# Patient Record
Sex: FEMALE | Race: WHITE | Employment: UNEMPLOYED | URBAN - METROPOLITAN AREA
[De-identification: names, ages, dates, MRNs, and addresses within clinical notes are randomized per-mention and may not be internally consistent; named-entity substitution may affect disease eponyms.]

---

## 2018-05-21 ENCOUNTER — HOSPITAL ENCOUNTER (EMERGENCY)
Age: 58
Discharge: HOME | End: 2018-05-22
Attending: EMERGENCY MEDICINE
Payer: OTHER GOVERNMENT

## 2018-05-21 ENCOUNTER — APPOINTMENT (OUTPATIENT)
Dept: GENERAL RADIOLOGY | Age: 58
End: 2018-05-21
Attending: EMERGENCY MEDICINE
Payer: OTHER GOVERNMENT

## 2018-05-21 DIAGNOSIS — R07.9 ACUTE CHEST PAIN: Primary | ICD-10-CM

## 2018-05-21 DIAGNOSIS — R10.13 ABDOMINAL PAIN, ACUTE, EPIGASTRIC: ICD-10-CM

## 2018-05-21 LAB
ALBUMIN SERPL-MCNC: 4.1 G/DL (ref 3.4–5)
ALBUMIN/GLOB SERPL: 1.1 {RATIO} (ref 0.8–1.7)
ALP SERPL-CCNC: 130 U/L (ref 45–117)
ALT SERPL-CCNC: 24 U/L (ref 13–56)
ANION GAP SERPL CALC-SCNC: 9 MMOL/L (ref 3–18)
AST SERPL-CCNC: 21 U/L (ref 15–37)
BASOPHILS # BLD: 0.1 K/UL (ref 0–0.06)
BASOPHILS NFR BLD: 1 % (ref 0–2)
BILIRUB SERPL-MCNC: 0.4 MG/DL (ref 0.2–1)
BUN SERPL-MCNC: 9 MG/DL (ref 7–18)
BUN/CREAT SERPL: 11 (ref 12–20)
CALCIUM SERPL-MCNC: 9.1 MG/DL (ref 8.5–10.1)
CHLORIDE SERPL-SCNC: 110 MMOL/L (ref 100–108)
CO2 SERPL-SCNC: 24 MMOL/L (ref 21–32)
CREAT SERPL-MCNC: 0.82 MG/DL (ref 0.6–1.3)
DIFFERENTIAL METHOD BLD: ABNORMAL
EOSINOPHIL # BLD: 0.2 K/UL (ref 0–0.4)
EOSINOPHIL NFR BLD: 2 % (ref 0–5)
ERYTHROCYTE [DISTWIDTH] IN BLOOD BY AUTOMATED COUNT: 13.5 % (ref 11.6–14.5)
GLOBULIN SER CALC-MCNC: 3.8 G/DL (ref 2–4)
GLUCOSE SERPL-MCNC: 123 MG/DL (ref 74–99)
HCT VFR BLD AUTO: 38.9 % (ref 35–45)
HGB BLD-MCNC: 13.8 G/DL (ref 12–16)
LIPASE SERPL-CCNC: 447 U/L (ref 73–393)
LYMPHOCYTES # BLD: 2.2 K/UL (ref 0.9–3.6)
LYMPHOCYTES NFR BLD: 32 % (ref 21–52)
MCH RBC QN AUTO: 32.5 PG (ref 24–34)
MCHC RBC AUTO-ENTMCNC: 35.5 G/DL (ref 31–37)
MCV RBC AUTO: 91.5 FL (ref 74–97)
MONOCYTES # BLD: 0.4 K/UL (ref 0.05–1.2)
MONOCYTES NFR BLD: 7 % (ref 3–10)
NEUTS SEG # BLD: 4 K/UL (ref 1.8–8)
NEUTS SEG NFR BLD: 58 % (ref 40–73)
PLATELET # BLD AUTO: 212 K/UL (ref 135–420)
PMV BLD AUTO: 9.8 FL (ref 9.2–11.8)
POTASSIUM SERPL-SCNC: 3.6 MMOL/L (ref 3.5–5.5)
PROT SERPL-MCNC: 7.9 G/DL (ref 6.4–8.2)
RBC # BLD AUTO: 4.25 M/UL (ref 4.2–5.3)
SODIUM SERPL-SCNC: 143 MMOL/L (ref 136–145)
TROPONIN I SERPL-MCNC: <0.02 NG/ML (ref 0–0.04)
WBC # BLD AUTO: 6.8 K/UL (ref 4.6–13.2)

## 2018-05-21 PROCEDURE — 85025 COMPLETE CBC W/AUTO DIFF WBC: CPT | Performed by: EMERGENCY MEDICINE

## 2018-05-21 PROCEDURE — 84484 ASSAY OF TROPONIN QUANT: CPT | Performed by: EMERGENCY MEDICINE

## 2018-05-21 PROCEDURE — 80307 DRUG TEST PRSMV CHEM ANLYZR: CPT | Performed by: EMERGENCY MEDICINE

## 2018-05-21 PROCEDURE — 83690 ASSAY OF LIPASE: CPT | Performed by: EMERGENCY MEDICINE

## 2018-05-21 PROCEDURE — 93005 ELECTROCARDIOGRAM TRACING: CPT

## 2018-05-21 PROCEDURE — 99283 EMERGENCY DEPT VISIT LOW MDM: CPT

## 2018-05-21 PROCEDURE — 96374 THER/PROPH/DIAG INJ IV PUSH: CPT

## 2018-05-21 PROCEDURE — 74011250636 HC RX REV CODE- 250/636: Performed by: EMERGENCY MEDICINE

## 2018-05-21 PROCEDURE — 71045 X-RAY EXAM CHEST 1 VIEW: CPT

## 2018-05-21 PROCEDURE — 99282 EMERGENCY DEPT VISIT SF MDM: CPT

## 2018-05-21 PROCEDURE — 74011000250 HC RX REV CODE- 250: Performed by: EMERGENCY MEDICINE

## 2018-05-21 PROCEDURE — 96375 TX/PRO/DX INJ NEW DRUG ADDON: CPT

## 2018-05-21 PROCEDURE — 80053 COMPREHEN METABOLIC PANEL: CPT | Performed by: EMERGENCY MEDICINE

## 2018-05-21 PROCEDURE — 96376 TX/PRO/DX INJ SAME DRUG ADON: CPT

## 2018-05-21 RX ORDER — MORPHINE SULFATE 10 MG/ML
4 INJECTION, SOLUTION INTRAMUSCULAR; INTRAVENOUS
Status: COMPLETED | OUTPATIENT
Start: 2018-05-21 | End: 2018-05-21

## 2018-05-21 RX ORDER — ONDANSETRON 2 MG/ML
4 INJECTION INTRAMUSCULAR; INTRAVENOUS
Status: COMPLETED | OUTPATIENT
Start: 2018-05-21 | End: 2018-05-21

## 2018-05-21 RX ORDER — FAMOTIDINE 10 MG/ML
20 INJECTION INTRAVENOUS
Status: COMPLETED | OUTPATIENT
Start: 2018-05-21 | End: 2018-05-21

## 2018-05-21 RX ADMIN — FAMOTIDINE 20 MG: 10 INJECTION INTRAVENOUS at 22:16

## 2018-05-21 RX ADMIN — MORPHINE SULFATE 4 MG: 10 INJECTION, SOLUTION INTRAMUSCULAR; INTRAVENOUS at 23:58

## 2018-05-21 RX ADMIN — ONDANSETRON 4 MG: 2 INJECTION INTRAMUSCULAR; INTRAVENOUS at 22:16

## 2018-05-21 RX ADMIN — MORPHINE SULFATE 4 MG: 10 INJECTION INTRAMUSCULAR; INTRAVENOUS; SUBCUTANEOUS at 22:16

## 2018-05-22 ENCOUNTER — APPOINTMENT (OUTPATIENT)
Dept: CT IMAGING | Age: 58
End: 2018-05-22
Attending: EMERGENCY MEDICINE
Payer: OTHER GOVERNMENT

## 2018-05-22 VITALS
HEIGHT: 64 IN | RESPIRATION RATE: 17 BRPM | TEMPERATURE: 97.7 F | DIASTOLIC BLOOD PRESSURE: 72 MMHG | HEART RATE: 95 BPM | BODY MASS INDEX: 22.88 KG/M2 | SYSTOLIC BLOOD PRESSURE: 108 MMHG | WEIGHT: 134 LBS | OXYGEN SATURATION: 100 %

## 2018-05-22 LAB — ETHANOL SERPL-MCNC: 92 MG/DL (ref 0–3)

## 2018-05-22 PROCEDURE — C9113 INJ PANTOPRAZOLE SODIUM, VIA: HCPCS | Performed by: EMERGENCY MEDICINE

## 2018-05-22 PROCEDURE — 74011636320 HC RX REV CODE- 636/320: Performed by: EMERGENCY MEDICINE

## 2018-05-22 PROCEDURE — 74011250636 HC RX REV CODE- 250/636: Performed by: EMERGENCY MEDICINE

## 2018-05-22 PROCEDURE — 74177 CT ABD & PELVIS W/CONTRAST: CPT

## 2018-05-22 RX ORDER — PANTOPRAZOLE SODIUM 40 MG/1
40 TABLET, DELAYED RELEASE ORAL 2 TIMES DAILY
Qty: 60 TAB | Refills: 3 | Status: SHIPPED | OUTPATIENT
Start: 2018-05-22 | End: 2018-06-21

## 2018-05-22 RX ORDER — MAG HYDROX/ALUMINUM HYD/SIMETH 200-200-20
30 SUSPENSION, ORAL (FINAL DOSE FORM) ORAL
Qty: 769 ML | Refills: 0 | Status: SHIPPED | OUTPATIENT
Start: 2018-05-22 | End: 2021-01-01

## 2018-05-22 RX ORDER — PANTOPRAZOLE SODIUM 40 MG/10ML
80 INJECTION, POWDER, LYOPHILIZED, FOR SOLUTION INTRAVENOUS
Status: COMPLETED | OUTPATIENT
Start: 2018-05-22 | End: 2018-05-22

## 2018-05-22 RX ADMIN — IOPAMIDOL 99 ML: 612 INJECTION, SOLUTION INTRAVENOUS at 00:45

## 2018-05-22 RX ADMIN — PANTOPRAZOLE SODIUM 80 MG: 40 INJECTION, POWDER, FOR SOLUTION INTRAVENOUS at 01:46

## 2018-05-22 NOTE — ED PROVIDER NOTES
HPI Comments: Audi Casanova is a 56 y.o. Female with no known cardiac history with c/o onset of central cp, upper abd pain radiating to back that occurred while at rest about 2 hours ago followed by vomiting. No diarrhea, syncope, arm pain, sweats. No blood in stool, melena. No sig abd issues, h/o pancreatitis, biliary disease. Previous ct with no documented sig abd pathology. Nothing taken. Severe stabbing pain worse with palpation. Nothing taken  Admits to alcohol earlier today. The history is provided by the patient and medical records. Past Medical History:   Diagnosis Date    Gastrointestinal disorder     GERD    Herpes        Past Surgical History:   Procedure Laterality Date    HX OTHER SURGICAL           History reviewed. No pertinent family history. Social History     Social History    Marital status:      Spouse name: N/A    Number of children: N/A    Years of education: N/A     Occupational History    Not on file. Social History Main Topics    Smoking status: Current Every Day Smoker     Packs/day: 0.50    Smokeless tobacco: Not on file    Alcohol use Yes    Drug use: Yes     Special: Marijuana      Comment: four days ago     Sexual activity: Not on file     Other Topics Concern    Not on file     Social History Narrative         ALLERGIES: Latex and Valium [diazepam]    Review of Systems   Constitutional: Negative for fever. HENT: Negative for sore throat and trouble swallowing. Eyes: Negative for visual disturbance. Respiratory: Negative for cough. Cardiovascular: Positive for chest pain. Gastrointestinal: Positive for abdominal pain. Negative for blood in stool and constipation. Endocrine: Negative for polyuria. Genitourinary: Negative for difficulty urinating and dysuria. Musculoskeletal: Negative for gait problem. Skin: Negative for rash. Allergic/Immunologic: Negative for immunocompromised state. Neurological: Negative for syncope. Psychiatric/Behavioral: Positive for sleep disturbance. Vitals:    05/21/18 2121   BP: (!) 127/91   Pulse: 95   Resp: 17   Temp: 97.7 °F (36.5 °C)   SpO2: 100%   Weight: 60.8 kg (134 lb)   Height: 5' 4\" (1.626 m)            Physical Exam   Constitutional: She is oriented to person, place, and time. She appears well-developed and well-nourished. She appears distressed. HENT:   Head: Normocephalic and atraumatic. Right Ear: External ear normal.   Left Ear: External ear normal.   Nose: Nose normal.   Mouth/Throat: Uvula is midline, oropharynx is clear and moist and mucous membranes are normal.   Eyes: Conjunctivae are normal. No scleral icterus. Neck: Neck supple. Cardiovascular: Normal rate, regular rhythm, normal heart sounds and intact distal pulses. Pulmonary/Chest: Effort normal and breath sounds normal.   Abdominal: Soft. Normal appearance. She exhibits no distension, no pulsatile midline mass and no mass. There is no hepatosplenomegaly. There is tenderness in the epigastric area. There is guarding. There is no rigidity, no rebound and no CVA tenderness. Musculoskeletal: She exhibits no edema. Neurological: She is alert and oriented to person, place, and time. Gait normal.   Skin: Skin is warm and dry. She is not diaphoretic. Psychiatric: Her behavior is normal.   Nursing note and vitals reviewed.        [facility]      ED Course       Procedures    Vitals:  Patient Vitals for the past 12 hrs:   Temp Pulse Resp BP SpO2   05/21/18 2121 97.7 °F (36.5 °C) 95 17 (!) 127/91 100 %         Medications ordered:   Medications   pantoprazole (PROTONIX) injection 80 mg (not administered)   morphine injection 4 mg (4 mg IntraVENous Given 5/21/18 2216)   ondansetron (ZOFRAN) injection 4 mg (4 mg IntraVENous Given 5/21/18 2216)   famotidine (PF) (PEPCID) injection 20 mg (20 mg IntraVENous Given 5/21/18 2216)   morphine injection 4 mg (4 mg IntraVENous Given 5/21/18 2358)   iopamidol (ISOVUE 300) 61 % contrast injection 100 mL (99 mL IntraVENous Given 5/22/18 0045)         Lab findings:  Recent Results (from the past 12 hour(s))   EKG, 12 LEAD, INITIAL    Collection Time: 05/21/18  9:24 PM   Result Value Ref Range    Ventricular Rate 79 BPM    Atrial Rate 79 BPM    P-R Interval 138 ms    QRS Duration 80 ms    Q-T Interval 394 ms    QTC Calculation (Bezet) 451 ms    Calculated P Axis -10 degrees    Calculated R Axis 57 degrees    Calculated T Axis 51 degrees    Diagnosis       Normal sinus rhythm  Normal ECG  When compared with ECG of 30-DEC-2016 21:28,  No significant change was found     CBC WITH AUTOMATED DIFF    Collection Time: 05/21/18 10:21 PM   Result Value Ref Range    WBC 6.8 4.6 - 13.2 K/uL    RBC 4.25 4.20 - 5.30 M/uL    HGB 13.8 12.0 - 16.0 g/dL    HCT 38.9 35.0 - 45.0 %    MCV 91.5 74.0 - 97.0 FL    MCH 32.5 24.0 - 34.0 PG    MCHC 35.5 31.0 - 37.0 g/dL    RDW 13.5 11.6 - 14.5 %    PLATELET 776 045 - 397 K/uL    MPV 9.8 9.2 - 11.8 FL    NEUTROPHILS 58 40 - 73 %    LYMPHOCYTES 32 21 - 52 %    MONOCYTES 7 3 - 10 %    EOSINOPHILS 2 0 - 5 %    BASOPHILS 1 0 - 2 %    ABS. NEUTROPHILS 4.0 1.8 - 8.0 K/UL    ABS. LYMPHOCYTES 2.2 0.9 - 3.6 K/UL    ABS. MONOCYTES 0.4 0.05 - 1.2 K/UL    ABS. EOSINOPHILS 0.2 0.0 - 0.4 K/UL    ABS. BASOPHILS 0.1 (H) 0.0 - 0.06 K/UL    DF AUTOMATED     METABOLIC PANEL, COMPREHENSIVE    Collection Time: 05/21/18 10:21 PM   Result Value Ref Range    Sodium 143 136 - 145 mmol/L    Potassium 3.6 3.5 - 5.5 mmol/L    Chloride 110 (H) 100 - 108 mmol/L    CO2 24 21 - 32 mmol/L    Anion gap 9 3.0 - 18 mmol/L    Glucose 123 (H) 74 - 99 mg/dL    BUN 9 7.0 - 18 MG/DL    Creatinine 0.82 0.6 - 1.3 MG/DL    BUN/Creatinine ratio 11 (L) 12 - 20      GFR est AA >60 >60 ml/min/1.73m2    GFR est non-AA >60 >60 ml/min/1.73m2    Calcium 9.1 8.5 - 10.1 MG/DL    Bilirubin, total 0.4 0.2 - 1.0 MG/DL    ALT (SGPT) 24 13 - 56 U/L    AST (SGOT) 21 15 - 37 U/L    Alk.  phosphatase 130 (H) 45 - 117 U/L Protein, total 7.9 6.4 - 8.2 g/dL    Albumin 4.1 3.4 - 5.0 g/dL    Globulin 3.8 2.0 - 4.0 g/dL    A-G Ratio 1.1 0.8 - 1.7     LIPASE    Collection Time: 05/21/18 10:21 PM   Result Value Ref Range    Lipase 447 (H) 73 - 393 U/L   TROPONIN I    Collection Time: 05/21/18 10:21 PM   Result Value Ref Range    Troponin-I, Qt. <0.02 0.0 - 0.045 NG/ML   ETHYL ALCOHOL    Collection Time: 05/21/18 10:21 PM   Result Value Ref Range    ALCOHOL(ETHYL),SERUM 92 (H) 0 - 3 MG/DL       EKG interpretation by ED Physician:  nsr with no acute st tw changes  Rate 79, pr 138, qtc 451  No sig change from previous    Cardiac monitor: nl rate reg rhythm. No ectopy  Pulse ox: 100% ra      X-Ray, CT or other radiology findings or impressions:  XR CHEST PORT    (Results Pending)   CT ABD PELV W CONT    (Results Pending)   ct with esophageal thickening. No pancreatitis    Progress notes, Consult notes or additional Procedure notes:   Doubt need for admission. D/w pt need for gi f/u, and will increase her ppi at home  More c/w gi related issue  I have discussed with patient and/or family/sig other the results, interpretation of any imaging if performed, suspected diagnosis and treatment plan to include instructions regarding the diagnoses listed to which understanding was expressed with all questions answered      Reevaluation of patient:   stable    Disposition:  Diagnosis:   1. Acute chest pain    2. Abdominal pain, acute, epigastric        Disposition: home      Follow-up Information     Follow up With Details [address] [facility] IMAN AGUILERA Schedule an appointment as soon as possible for a visit  [place] [id number] [facility] Dodie Marie [address]    Delores Rodriguez MD Schedule an appointment as soon as possible for a visit to get EGD set up [address]  [address]  [phone]              Patient's Medications   Start Taking    ALUM-MAG HYDROXIDE-SIMETH (MYLANTA) 200-200-20 MG/5 ML SUSP    Take 30 mL by mouth four (4) times daily as needed. PANTOPRAZOLE (PROTONIX) 40 MG TABLET    Take 1 Tab by mouth two (2) times a day for 30 days. Continue Taking    ALBUTEROL (PROVENTIL HFA, VENTOLIN HFA, PROAIR HFA) 90 MCG/ACTUATION INHALER    Take 1 Puff by inhalation every four (4) hours as needed for Wheezing. B.INFANTIS-B. ANI-B. LONG-B.BIFI (PROBIOTIC 4X) 10-15 MG TBEC    Take  by mouth. DEXTROMETHORPHAN-GUAIFENESIN (ROBITUSSIN-DM)  MG/5 ML SYRUP    Take 10 mL by mouth every six (6) hours as needed for Cough. ONDANSETRON HCL (ZOFRAN, AS HYDROCHLORIDE,) 4 MG TABLET    Take 2 Tabs by mouth every eight (8) hours as needed for Nausea. These Medications have changed    No medications on file   Stop Taking    ESOMEPRAZOLE MAGNESIUM (NEXIUM PO)    Take  by mouth.

## 2018-05-22 NOTE — ED TRIAGE NOTES
C/o burning epigastric pain that radiates down abdomen and midback with nausea and vomiting x1 hour.

## 2018-05-22 NOTE — ED NOTES
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 She has  and will talk to her PCP about who to see.

## 2018-05-22 NOTE — ED NOTES
1: 57 AM  05/22/18     Discharge instructions given to patient (name) with verbalization of understanding. Patient accompanied by self. Patient discharged with the following prescriptions Mylanta, Protonix. Patient discharged to home (destination).       Poncho Wilkinson RN

## 2018-05-23 LAB
ATRIAL RATE: 79 BPM
CALCULATED P AXIS, ECG09: -10 DEGREES
CALCULATED R AXIS, ECG10: 57 DEGREES
CALCULATED T AXIS, ECG11: 51 DEGREES
DIAGNOSIS, 93000: NORMAL
P-R INTERVAL, ECG05: 138 MS
Q-T INTERVAL, ECG07: 394 MS
QRS DURATION, ECG06: 80 MS
QTC CALCULATION (BEZET), ECG08: 451 MS
VENTRICULAR RATE, ECG03: 79 BPM

## 2020-12-31 ENCOUNTER — HOSPITAL ENCOUNTER (INPATIENT)
Age: 60
LOS: 1 days | Discharge: HOME | DRG: 917 | End: 2021-01-01
Attending: EMERGENCY MEDICINE | Admitting: INTERNAL MEDICINE
Payer: OTHER GOVERNMENT

## 2020-12-31 ENCOUNTER — APPOINTMENT (OUTPATIENT)
Dept: CT IMAGING | Age: 60
DRG: 917 | End: 2020-12-31
Attending: EMERGENCY MEDICINE
Payer: OTHER GOVERNMENT

## 2020-12-31 DIAGNOSIS — F19.921 DRUG INTOXICATION WITH DELIRIUM (HCC): Primary | ICD-10-CM

## 2020-12-31 PROBLEM — F19.10 SUBSTANCE ABUSE (HCC): Status: ACTIVE | Noted: 2020-12-31

## 2020-12-31 PROBLEM — Z72.0 TOBACCO ABUSE: Status: ACTIVE | Noted: 2020-12-31

## 2020-12-31 PROBLEM — R06.2 WHEEZES: Status: ACTIVE | Noted: 2020-12-31

## 2020-12-31 PROBLEM — Z88.8: Status: ACTIVE | Noted: 2020-12-31

## 2020-12-31 PROBLEM — T50.901A DRUG OVERDOSE: Status: ACTIVE | Noted: 2020-12-31

## 2020-12-31 PROBLEM — K21.9 GERD (GASTROESOPHAGEAL REFLUX DISEASE): Status: ACTIVE | Noted: 2020-12-31

## 2020-12-31 PROBLEM — F10.929 ALCOHOL INTOXICATION (HCC): Status: ACTIVE | Noted: 2020-12-31

## 2020-12-31 LAB
ALBUMIN SERPL-MCNC: 3 G/DL (ref 3.4–5)
ALBUMIN SERPL-MCNC: 3.4 G/DL (ref 3.4–5)
ALBUMIN/GLOB SERPL: 0.9 {RATIO} (ref 0.8–1.7)
ALBUMIN/GLOB SERPL: 1 {RATIO} (ref 0.8–1.7)
ALP SERPL-CCNC: 130 U/L (ref 45–117)
ALP SERPL-CCNC: 141 U/L (ref 45–117)
ALT SERPL-CCNC: 58 U/L (ref 13–56)
ALT SERPL-CCNC: 67 U/L (ref 13–56)
AMPHET UR QL SCN: NEGATIVE
ANION GAP SERPL CALC-SCNC: 5 MMOL/L (ref 3–18)
APAP SERPL-MCNC: <2 UG/ML (ref 10–30)
AST SERPL-CCNC: 62 U/L (ref 10–38)
AST SERPL-CCNC: 81 U/L (ref 10–38)
BARBITURATES UR QL SCN: NEGATIVE
BASOPHILS # BLD: 0 K/UL (ref 0–0.1)
BASOPHILS NFR BLD: 1 % (ref 0–2)
BENZODIAZ UR QL: NEGATIVE
BILIRUB DIRECT SERPL-MCNC: 0.1 MG/DL (ref 0–0.2)
BILIRUB SERPL-MCNC: 0.3 MG/DL (ref 0.2–1)
BILIRUB SERPL-MCNC: 0.3 MG/DL (ref 0.2–1)
BUN SERPL-MCNC: 7 MG/DL (ref 7–18)
BUN/CREAT SERPL: 12 (ref 12–20)
CALCIUM SERPL-MCNC: 8 MG/DL (ref 8.5–10.1)
CANNABINOIDS UR QL SCN: NEGATIVE
CHLORIDE SERPL-SCNC: 113 MMOL/L (ref 100–111)
CK SERPL-CCNC: 46 U/L (ref 26–192)
CO2 SERPL-SCNC: 25 MMOL/L (ref 21–32)
COCAINE UR QL SCN: NEGATIVE
COVID-19 RAPID TEST, COVR: NOT DETECTED
CREAT SERPL-MCNC: 0.59 MG/DL (ref 0.6–1.3)
DIFFERENTIAL METHOD BLD: ABNORMAL
EOSINOPHIL # BLD: 0.1 K/UL (ref 0–0.4)
EOSINOPHIL NFR BLD: 2 % (ref 0–5)
ERYTHROCYTE [DISTWIDTH] IN BLOOD BY AUTOMATED COUNT: 12.9 % (ref 11.6–14.5)
ETHANOL SERPL-MCNC: 209 MG/DL (ref 0–3)
GLOBULIN SER CALC-MCNC: 3.2 G/DL (ref 2–4)
GLOBULIN SER CALC-MCNC: 3.4 G/DL (ref 2–4)
GLUCOSE SERPL-MCNC: 79 MG/DL (ref 74–99)
HCT VFR BLD AUTO: 41.5 % (ref 35–45)
HDSCOM,HDSCOM: NORMAL
HGB BLD-MCNC: 13.7 G/DL (ref 12–16)
LACTATE SERPL-SCNC: 2.9 MMOL/L (ref 0.4–2)
LYMPHOCYTES # BLD: 2.2 K/UL (ref 0.9–3.6)
LYMPHOCYTES NFR BLD: 40 % (ref 21–52)
MCH RBC QN AUTO: 34 PG (ref 24–34)
MCHC RBC AUTO-ENTMCNC: 33 G/DL (ref 31–37)
MCV RBC AUTO: 103 FL (ref 74–97)
METHADONE UR QL: NEGATIVE
MONOCYTES # BLD: 0.3 K/UL (ref 0.05–1.2)
MONOCYTES NFR BLD: 5 % (ref 3–10)
NEUTS SEG # BLD: 2.8 K/UL (ref 1.8–8)
NEUTS SEG NFR BLD: 52 % (ref 40–73)
OPIATES UR QL: NEGATIVE
PCP UR QL: NEGATIVE
PLATELET # BLD AUTO: 171 K/UL (ref 135–420)
PMV BLD AUTO: 9.8 FL (ref 9.2–11.8)
POTASSIUM SERPL-SCNC: 3.6 MMOL/L (ref 3.5–5.5)
PROT SERPL-MCNC: 6.2 G/DL (ref 6.4–8.2)
PROT SERPL-MCNC: 6.8 G/DL (ref 6.4–8.2)
RBC # BLD AUTO: 4.03 M/UL (ref 4.2–5.3)
SALICYLATES SERPL-MCNC: 2.7 MG/DL (ref 2.8–20)
SODIUM SERPL-SCNC: 143 MMOL/L (ref 136–145)
SOURCE, COVRS: NORMAL
SPECIMEN TYPE, XMCV1T: NORMAL
WBC # BLD AUTO: 5.4 K/UL (ref 4.6–13.2)

## 2020-12-31 PROCEDURE — 87635 SARS-COV-2 COVID-19 AMP PRB: CPT

## 2020-12-31 PROCEDURE — 74011000250 HC RX REV CODE- 250: Performed by: INTERNAL MEDICINE

## 2020-12-31 PROCEDURE — 65610000006 HC RM INTENSIVE CARE

## 2020-12-31 PROCEDURE — 74011250636 HC RX REV CODE- 250/636: Performed by: INTERNAL MEDICINE

## 2020-12-31 PROCEDURE — 85025 COMPLETE CBC W/AUTO DIFF WBC: CPT

## 2020-12-31 PROCEDURE — 93005 ELECTROCARDIOGRAM TRACING: CPT

## 2020-12-31 PROCEDURE — 80307 DRUG TEST PRSMV CHEM ANLYZR: CPT

## 2020-12-31 PROCEDURE — 94640 AIRWAY INHALATION TREATMENT: CPT

## 2020-12-31 PROCEDURE — 99285 EMERGENCY DEPT VISIT HI MDM: CPT

## 2020-12-31 PROCEDURE — 2709999900 HC NON-CHARGEABLE SUPPLY

## 2020-12-31 PROCEDURE — 80076 HEPATIC FUNCTION PANEL: CPT

## 2020-12-31 PROCEDURE — 82550 ASSAY OF CK (CPK): CPT

## 2020-12-31 PROCEDURE — 74011250636 HC RX REV CODE- 250/636: Performed by: HOSPITALIST

## 2020-12-31 PROCEDURE — 96374 THER/PROPH/DIAG INJ IV PUSH: CPT

## 2020-12-31 PROCEDURE — 74011250636 HC RX REV CODE- 250/636: Performed by: EMERGENCY MEDICINE

## 2020-12-31 PROCEDURE — 70450 CT HEAD/BRAIN W/O DYE: CPT

## 2020-12-31 PROCEDURE — 74011000258 HC RX REV CODE- 258: Performed by: INTERNAL MEDICINE

## 2020-12-31 PROCEDURE — 80053 COMPREHEN METABOLIC PANEL: CPT

## 2020-12-31 PROCEDURE — 83605 ASSAY OF LACTIC ACID: CPT

## 2020-12-31 RX ORDER — IPRATROPIUM BROMIDE AND ALBUTEROL SULFATE 2.5; .5 MG/3ML; MG/3ML
3 SOLUTION RESPIRATORY (INHALATION)
Status: DISCONTINUED | OUTPATIENT
Start: 2020-12-31 | End: 2021-01-01 | Stop reason: HOSPADM

## 2020-12-31 RX ORDER — ZOLPIDEM TARTRATE 10 MG/1
10 TABLET ORAL
COMMUNITY
End: 2021-01-01

## 2020-12-31 RX ORDER — NALOXONE HYDROCHLORIDE 0.4 MG/ML
0.4 INJECTION, SOLUTION INTRAMUSCULAR; INTRAVENOUS; SUBCUTANEOUS AS NEEDED
Status: DISCONTINUED | OUTPATIENT
Start: 2020-12-31 | End: 2021-01-01 | Stop reason: HOSPADM

## 2020-12-31 RX ORDER — NALOXONE HYDROCHLORIDE 1 MG/ML
0.4 INJECTION INTRAMUSCULAR; INTRAVENOUS; SUBCUTANEOUS
Status: COMPLETED | OUTPATIENT
Start: 2020-12-31 | End: 2020-12-31

## 2020-12-31 RX ORDER — ENOXAPARIN SODIUM 100 MG/ML
40 INJECTION SUBCUTANEOUS DAILY
Status: DISCONTINUED | OUTPATIENT
Start: 2021-01-01 | End: 2021-01-01 | Stop reason: HOSPADM

## 2020-12-31 RX ORDER — SODIUM CHLORIDE 0.9 % (FLUSH) 0.9 %
5-40 SYRINGE (ML) INJECTION EVERY 8 HOURS
Status: DISCONTINUED | OUTPATIENT
Start: 2020-12-31 | End: 2021-01-01 | Stop reason: HOSPADM

## 2020-12-31 RX ORDER — ONDANSETRON 2 MG/ML
4 INJECTION INTRAMUSCULAR; INTRAVENOUS
Status: DISCONTINUED | OUTPATIENT
Start: 2020-12-31 | End: 2021-01-01 | Stop reason: HOSPADM

## 2020-12-31 RX ORDER — POLYETHYLENE GLYCOL 3350 17 G/17G
17 POWDER, FOR SOLUTION ORAL DAILY PRN
Status: DISCONTINUED | OUTPATIENT
Start: 2020-12-31 | End: 2021-01-01 | Stop reason: HOSPADM

## 2020-12-31 RX ORDER — SODIUM CHLORIDE 0.9 % (FLUSH) 0.9 %
5-40 SYRINGE (ML) INJECTION AS NEEDED
Status: DISCONTINUED | OUTPATIENT
Start: 2020-12-31 | End: 2021-01-01 | Stop reason: HOSPADM

## 2020-12-31 RX ORDER — FENTANYL CITRATE 50 UG/ML
12.5 INJECTION, SOLUTION INTRAMUSCULAR; INTRAVENOUS
Status: DISCONTINUED | OUTPATIENT
Start: 2020-12-31 | End: 2021-01-01 | Stop reason: HOSPADM

## 2020-12-31 RX ORDER — GABAPENTIN 100 MG/1
100 CAPSULE ORAL 3 TIMES DAILY
COMMUNITY

## 2020-12-31 RX ORDER — ACETAMINOPHEN 650 MG/1
650 SUPPOSITORY RECTAL
Status: DISCONTINUED | OUTPATIENT
Start: 2020-12-31 | End: 2021-01-01 | Stop reason: HOSPADM

## 2020-12-31 RX ORDER — PROMETHAZINE HYDROCHLORIDE 25 MG/1
12.5 TABLET ORAL
Status: DISCONTINUED | OUTPATIENT
Start: 2020-12-31 | End: 2021-01-01 | Stop reason: HOSPADM

## 2020-12-31 RX ORDER — IPRATROPIUM BROMIDE AND ALBUTEROL SULFATE 2.5; .5 MG/3ML; MG/3ML
3 SOLUTION RESPIRATORY (INHALATION)
Status: COMPLETED | OUTPATIENT
Start: 2020-12-31 | End: 2020-12-31

## 2020-12-31 RX ORDER — FENTANYL CITRATE 50 UG/ML
12.5 INJECTION, SOLUTION INTRAMUSCULAR; INTRAVENOUS ONCE
Status: COMPLETED | OUTPATIENT
Start: 2020-12-31 | End: 2020-12-31

## 2020-12-31 RX ORDER — ACETAMINOPHEN 325 MG/1
650 TABLET ORAL
Status: DISCONTINUED | OUTPATIENT
Start: 2020-12-31 | End: 2021-01-01 | Stop reason: HOSPADM

## 2020-12-31 RX ADMIN — NALOXONE HYDROCHLORIDE 0.4 MG: 1 INJECTION PARENTERAL at 12:36

## 2020-12-31 RX ADMIN — FAMOTIDINE 20 MG: 10 INJECTION INTRAVENOUS at 21:34

## 2020-12-31 RX ADMIN — Medication 10 ML: at 22:00

## 2020-12-31 RX ADMIN — IPRATROPIUM BROMIDE AND ALBUTEROL SULFATE 3 ML: .5; 3 SOLUTION RESPIRATORY (INHALATION) at 15:31

## 2020-12-31 RX ADMIN — FENTANYL CITRATE 12.5 MCG: 50 INJECTION, SOLUTION INTRAMUSCULAR; INTRAVENOUS at 23:20

## 2020-12-31 RX ADMIN — NALOXONE HYDROCHLORIDE 0.4 MG: 1 INJECTION PARENTERAL at 12:42

## 2020-12-31 RX ADMIN — THIAMINE HYDROCHLORIDE: 100 INJECTION, SOLUTION INTRAMUSCULAR; INTRAVENOUS at 15:33

## 2020-12-31 RX ADMIN — IPRATROPIUM BROMIDE AND ALBUTEROL SULFATE 3 ML: .5; 3 SOLUTION RESPIRATORY (INHALATION) at 20:49

## 2020-12-31 RX ADMIN — NALOXONE HYDROCHLORIDE 0.4 MG: 1 INJECTION PARENTERAL at 12:31

## 2020-12-31 RX ADMIN — SODIUM CHLORIDE 1000 ML: 9 INJECTION, SOLUTION INTRAVENOUS at 15:03

## 2020-12-31 RX ADMIN — Medication 10 ML: at 18:27

## 2020-12-31 RX ADMIN — FENTANYL CITRATE 12.5 MCG: 50 INJECTION, SOLUTION INTRAMUSCULAR; INTRAVENOUS at 15:29

## 2020-12-31 NOTE — PROGRESS NOTES
([id number]) TRANSFER - IN REPORT:    Verbal report received from Vamsi Palafox, [facility] (name) on [facility]  being received from ED (unit) for routine progression of care      Report consisted of patients Situation, Background, Assessment and   Recommendations(SBAR). Information from the following report(s) SBAR, Intake/Output, MAR, Recent Results, Cardiac Rhythm NSR and Alarm Parameters  was reviewed with the receiving nurse. Opportunity for questions and clarification was provided. Assessment completed upon patients arrival to unit and care assumed. ([id number])  called. Updated on care and questions answered at this time.

## 2020-12-31 NOTE — ED PROVIDER NOTES
EMERGENCY DEPARTMENT HISTORY AND PHYSICAL EXAM    12:08 PM      Date: 12/31/2020  Patient Name: Monica Maddox    History of Presenting Illness     Chief Complaint   Patient presents with    Drug Overdose         History Provided By: EMS    Additional History (Context): Monica Maddox is a 64 y.o. female presents with EMS was called for unresponsive subject, pinpoint pupils minimally responsive given 2 mg of Narcan IN, became more alert. Also empty bottle of Ambien found in the area. On arrival to the ER she has eyes open but is otherwise not responding. History and review of systems limited by patient's unresponsive state. PCP: None    Chief Complaint:   Duration:    Timing:    Location:   Quality:   Severity:   Modifying Factors:   Associated Symptoms:       Current Outpatient Medications   Medication Sig Dispense Refill    alum-mag hydroxide-simeth (MYLANTA) 200-200-20 mg/5 mL susp Take 30 mL by mouth four (4) times daily as needed. 769 mL 0    ondansetron hcl (ZOFRAN, AS HYDROCHLORIDE,) 4 mg tablet Take 2 Tabs by mouth every eight (8) hours as needed for Nausea. 12 Tab 0    albuterol (PROVENTIL HFA, VENTOLIN HFA, PROAIR HFA) 90 mcg/actuation inhaler Take 1 Puff by inhalation every four (4) hours as needed for Wheezing. 1 Inhaler 0    dextromethorphan-guaiFENesin (ROBITUSSIN-DM)  mg/5 mL syrup Take 10 mL by mouth every six (6) hours as needed for Cough. 240 mL 0    B.infantis-B.ani-B.long-B.bifi (PROBIOTIC 4X) 10-15 mg TbEC Take  by mouth. Past History     Past Medical History:  Past Medical History:   Diagnosis Date    Gastrointestinal disorder     GERD    Herpes        Past Surgical History:  Past Surgical History:   Procedure Laterality Date    HX OTHER SURGICAL         Family History:  History reviewed. No pertinent family history.     Social History:  Social History     Tobacco Use    Smoking status: Current Every Day Smoker     Packs/day: 0.50   Substance Use Topics    Alcohol use: Yes    Drug use: Yes     Types: Marijuana     Comment: four days ago        Allergies: Allergies   Allergen Reactions    Latex Hives    Valium [Diazepam] Swelling         Review of Systems     Review of Systems      Physical Exam       Patient Vitals for the past 12 hrs:   Temp Pulse Resp BP SpO2   12/31/20 1354 97.3 °F (36.3 °C)       12/31/20 1345  81  94/62 97 %   12/31/20 1325  80  93/70 99 %   12/31/20 1300  84  93/64 96 %   12/31/20 1230  81   98 %   12/31/20 1215  87  110/74 97 %   12/31/20 1130    105/71 96 %   12/31/20 1121  90 16 120/78 97 %       Physical Exam  Vitals signs and nursing note reviewed. Constitutional:       Appearance: She is well-developed. Comments: Obtunded, unresponsive to pain. HENT:      Head: Normocephalic and atraumatic. Eyes:      General: No scleral icterus. Conjunctiva/sclera: Conjunctivae normal.      Comments: Disconjugate gaze midrange pupils appropriately reactive to light. Neck:      Musculoskeletal: Normal range of motion and neck supple. Vascular: No JVD. Cardiovascular:      Rate and Rhythm: Normal rate and regular rhythm. Heart sounds: Normal heart sounds. Comments: 4 intact extremity pulses  Pulmonary:      Effort: Pulmonary effort is normal.      Breath sounds: Normal breath sounds. Comments: Spontaneous unlabored respirations, protecting her airway  Abdominal:      Palpations: Abdomen is soft. There is no mass. Tenderness: There is no abdominal tenderness. Musculoskeletal: Normal range of motion. Lymphadenopathy:      Cervical: No cervical adenopathy. Skin:     General: Skin is warm and dry.            Diagnostic Study Results   Labs -  Recent Results (from the past 12 hour(s))   CBC WITH AUTOMATED DIFF    Collection Time: 12/31/20 11:30 AM   Result Value Ref Range    WBC 5.4 4.6 - 13.2 K/uL    RBC 4.03 (L) 4.20 - 5.30 M/uL    HGB 13.7 12.0 - 16.0 g/dL    HCT 41.5 35.0 - 45.0 %    .0 (H) 74.0 - 97.0 FL    MCH 34.0 24.0 - 34.0 PG    MCHC 33.0 31.0 - 37.0 g/dL    RDW 12.9 11.6 - 14.5 %    PLATELET 220 998 - 660 K/uL    MPV 9.8 9.2 - 11.8 FL    NEUTROPHILS 52 40 - 73 %    LYMPHOCYTES 40 21 - 52 %    MONOCYTES 5 3 - 10 %    EOSINOPHILS 2 0 - 5 %    BASOPHILS 1 0 - 2 %    ABS. NEUTROPHILS 2.8 1.8 - 8.0 K/UL    ABS. LYMPHOCYTES 2.2 0.9 - 3.6 K/UL    ABS. MONOCYTES 0.3 0.05 - 1.2 K/UL    ABS. EOSINOPHILS 0.1 0.0 - 0.4 K/UL    ABS. BASOPHILS 0.0 0.0 - 0.1 K/UL    DF AUTOMATED     METABOLIC PANEL, COMPREHENSIVE    Collection Time: 12/31/20 11:30 AM   Result Value Ref Range    Sodium 143 136 - 145 mmol/L    Potassium 3.6 3.5 - 5.5 mmol/L    Chloride 113 (H) 100 - 111 mmol/L    CO2 25 21 - 32 mmol/L    Anion gap 5 3.0 - 18 mmol/L    Glucose 79 74 - 99 mg/dL    BUN 7 7.0 - 18 MG/DL    Creatinine 0.59 (L) 0.6 - 1.3 MG/DL    BUN/Creatinine ratio 12 12 - 20      GFR est AA >60 >60 ml/min/1.73m2    GFR est non-AA >60 >60 ml/min/1.73m2    Calcium 8.0 (L) 8.5 - 10.1 MG/DL    Bilirubin, total 0.3 0.2 - 1.0 MG/DL    ALT (SGPT) 67 (H) 13 - 56 U/L    AST (SGOT) 81 (H) 10 - 38 U/L    Alk.  phosphatase 141 (H) 45 - 117 U/L    Protein, total 6.8 6.4 - 8.2 g/dL    Albumin 3.4 3.4 - 5.0 g/dL    Globulin 3.4 2.0 - 4.0 g/dL    A-G Ratio 1.0 0.8 - 1.7     ETHYL ALCOHOL    Collection Time: 12/31/20 11:30 AM   Result Value Ref Range    ALCOHOL(ETHYL),SERUM 209 (H) 0 - 3 MG/DL   SALICYLATE    Collection Time: 12/31/20 11:30 AM   Result Value Ref Range    Salicylate level 2.7 (L) 2.8 - 20.0 MG/DL   ACETAMINOPHEN    Collection Time: 12/31/20 11:30 AM   Result Value Ref Range    Acetaminophen level <2 (L) 10.0 - 30.0 ug/mL   DRUG SCREEN, URINE    Collection Time: 12/31/20 12:20 PM   Result Value Ref Range    BENZODIAZEPINES Negative NEG      BARBITURATES Negative NEG      THC (TH-CANNABINOL) Negative NEG      OPIATES Negative NEG      PCP(PHENCYCLIDINE) Negative NEG      COCAINE Negative NEG      AMPHETAMINES Negative NEG METHADONE Negative NEG      HDSCOM (NOTE)    SARS-COV-2    Collection Time: 12/31/20 12:20 PM   Result Value Ref Range    Specimen source Nasopharyngeal      COVID-19 rapid test Not detected NOTD      Specimen type NP Swab     EKG, 12 LEAD, INITIAL    Collection Time: 12/31/20 12:27 PM   Result Value Ref Range    Ventricular Rate 82 BPM    Atrial Rate 82 BPM    P-R Interval 138 ms    QRS Duration 80 ms    Q-T Interval 390 ms    QTC Calculation (Bezet) 455 ms    Calculated P Axis 42 degrees    Calculated R Axis 51 degrees    Calculated T Axis 50 degrees    Diagnosis       Normal sinus rhythm  Low voltage QRS  Septal infarct , age undetermined  Abnormal ECG  When compared with ECG of 21-MAY-2018 21:24,  Septal infarct is now present         Radiologic Studies -   CT HEAD WO CONT   Final Result   IMPRESSION:      No acute intracranial abnormality. _______________            Ct Head Wo Cont    Result Date: 12/31/2020  _______________ EXAM: CT HEAD WO CONT. _______________ CLINICAL INDICATION/HISTORY: Unresponsive suspect drug overdose. -Additional: None. COMPARISON: CT of 02/15/2015. _______________ TECHNIQUE/COMPARISON: Nonenhanced CT examination of the head was performed. Sagittal and coronal series were reformatted from the axial source images. One or more dose reduction techniques were used on this CT: automated exposure control, adjustment of the mAs and/or kVp according to patient size, and iterative reconstruction techniques. The specific techniques used on this CT exam have been documented in the patient's electronic medical record. Digital Imaging and Communications in Medicine (DICOM) format image data are available to nonaffiliated external healthcare facilities or entities on a secure, media free, reciprocally searchable basis with patient authorization for at least a 12-month period after this study.  _______________ FINDINGS: BRAIN AND POSTERIOR FOSSA: There is no evidence of acute vascular territorial ischemia, intracranial hemorrhage, midline shift or mass effect. The ventricles and sulci are within normal limits for the patient's age. EXTRA-AXIAL SPACES: There are no abnormal extra-axial fluid collections. CALVARIA AND SOFT TISSUES: No acute calvarial or extracalvarial soft tissue findings of concern. OTHER: The visualized paranasal sinuses are essentially clear, as are the mastoid air cells bilaterally. _______________     IMPRESSION: No acute intracranial abnormality. _______________       Medications ordered:   Medications   naloxone (NARCAN) injection 0.4 mg (0.4 mg IntraVENous Given 12/31/20 1242)         Medical Decision Making   Initial Medical Decision Making and DDx:     Highly suspicious for overdose, recreational or intentional with suicidal intent.  Blood sugar was okay for EMS do not suspect hypoglycemia.  Will evaluate for other metabolic abnormalities, alcohol intoxication salicylate acetaminophen other drugs of abuse.  We will give her additional Narcan.    ED Course: Progress Notes, Reevaluation, and Consults:  ED Course as of Dec 31 1419   Thu Dec 31, 2020   1246 Twelve-lead EKG sinus rhythm at 82 no acute disturbance of the electrical intervals.    [CB]   1355 All diagnostic studies are back drug screen is negative, alcohol level 200 CT scan clear.  Nurse will call poison control.  Calling hospitalist for admission, assuming this is effects of Ambien she may be unresponsive for a day or 2    [CB]   1356 Patient is still completely unresponsive, no hypoxia on room air protecting her airway well.    [CB]      ED Course User Index  [CB] Arnulfo Plaza MD     2:19 PM there is Álvaro discussed with poison control.  I discussed with Dr. Davidson hospitalist, discussed probable benzodiazepine analog overdose with alcohol involved.  Patient protecting her airway but unresponsive and expected long half-life of the drug.  He agrees with the admission.    I am the first provider for this  patient. I reviewed the vital signs, available nursing notes, past medical history, past surgical history, family history and social history. Patient Vitals for the past 12 hrs:   Temp Pulse Resp BP SpO2   12/31/20 1354 97.3 °F (36.3 °C)       12/31/20 1345  81  94/62 97 %   12/31/20 1325  80  93/70 99 %   12/31/20 1300  84  93/64 96 %   12/31/20 1230  81   98 %   12/31/20 1215  87  110/74 97 %   12/31/20 1130    105/71 96 %   12/31/20 1121  90 16 120/78 97 %       Vital Signs-Reviewed the patient's vital signs. Pulse Oximetry Analysis, Cardiac Monitor, 12 lead ekg: No hypoxia on room air  Interpreted by the EP. Records Reviewed: Nursing notes reviewed (Time of Review: 12:08 PM)    Procedures:   Critical Care Time:   Aspirin: (was aspirin given for stroke?)    Diagnosis     Clinical Impression:   1. Drug intoxication with delirium ([facility]        Disposition: Admitted      Follow-up Information    None          Patient's Medications   Start Taking    No medications on file   Continue Taking    ALBUTEROL (PROVENTIL HFA, VENTOLIN HFA, PROAIR HFA) 90 MCG/ACTUATION INHALER    Take 1 Puff by inhalation every four (4) hours as needed for Wheezing. ALUM-MAG HYDROXIDE-SIMETH (MYLANTA) 200-200-20 MG/5 ML SUSP    Take 30 mL by mouth four (4) times daily as needed. B.INFANTIS-B. ANI-B. LONG-B.BIFI (PROBIOTIC 4X) 10-15 MG TBEC    Take  by mouth. DEXTROMETHORPHAN-GUAIFENESIN (ROBITUSSIN-DM)  MG/5 ML SYRUP    Take 10 mL by mouth every six (6) hours as needed for Cough. ONDANSETRON HCL (ZOFRAN, AS HYDROCHLORIDE,) 4 MG TABLET    Take 2 Tabs by mouth every eight (8) hours as needed for Nausea.    These Medications have changed    No medications on file   Stop Taking    No medications on file     _______________________________    Notes:    Samira Llanes MD using Dragon dictation      _______________________________

## 2020-12-31 NOTE — PROGRESS NOTES
Initial assessment attempted with pt's spouse, Harley Henderson ([phone] and [phone] via phone. Left a non urgent voicemail requesting a return call.            Johnson Jo, MSN, RN, ACM-RN   ED Outcomes Manager  [phone] (phone)

## 2020-12-31 NOTE — ED NOTES
TRANSFER - ED to INPATIENT REPORT:    Verbal report given to Damari GALLEGOS(name) on Amado Santamaria  being transferred to [facility](unit) for routine progression of care       Report consisted of patients Situation, Background, Assessment and   Recommendations(SBAR). SBAR report made available to receiving floor on this patient being transferred to [address] [place] [address] [phone]  for routine progression of care       Admitting diagnosis Drug overdose [T50.901A]    Information from the following report(s) SBAR, ED Summary and MAR was made available to receiving floor.     Lines:   Peripheral IV 12/31/20 Left Wrist (Active)   Site Assessment Clean, dry, & intact 12/31/20 1126   Phlebitis Assessment 0 12/31/20 1126   Infiltration Assessment 0 12/31/20 1126   Dressing Status Clean, dry, & intact 12/31/20 1126   Dressing Type Transparent 12/31/20 1126   Hub Color/Line Status Pink;Flushed;Patent 12/31/20 1126        Patient eyes open to voice and appears alert to person     Valuables transported with patient     MAP (Monitor): 87 =Monitored (most recent)  Vitals w/ MEWS Score (last day)     Date/Time MEWS Score Pulse Resp Temp BP Level of Consciousness SpO2    12/31/20 15:36:12  2  94  18  97.3 °F (36.3 °C)  120/75  Responds to Voice  100 %    12/31/20 1532    96          100 %    12/31/20 1515    96      120/75    97 %    12/31/20 1430    83      103/75    97 %    12/31/20 1415    85          97 %    12/31/20 1400    84          97 %    12/31/20 1354        97.3 °F (36.3 °C)          12/31/20 1345    81      94/62    97 %    12/31/20 1330    82          97 %    12/31/20 1325    80      93/70    99 %    12/31/20 1315    87          95 %    12/31/20 1300    84      93/64    96 %    12/31/20 1230    81          98 %    12/31/20 1215    87      110/74    97 %    12/31/20 1130          105/71    96 %    12/31/20 1121    90  16    120/78  (!) Responds to Pain  97 %

## 2020-12-31 NOTE — ED TRIAGE NOTES
Pt arrives to ed via ems for c/o suspected drug overdose. Pt unresponsive for EMS, pinpoint pupils. Narcan 2mg administered by ems, pt became more alert. Pt eyes open to pain on arrival but otherwise not answering questions or following directions. Pt 99% on room air. Empty ambien bottle found with pt.   Pt  per EMS

## 2020-12-31 NOTE — PROGRESS NOTES
Physical Exam  Skin:     General: Skin is warm and dry. Capillary Refill: Capillary refill takes less than 2 seconds. Primary Nurse Aditi Kent RN and Lulu Alexander RN performed a dual skin assessment on this patient No impairment noted Lavell score is 18.

## 2020-12-31 NOTE — ED NOTES
Pt did not respond to any of the narcan administered. Provider made aware.   No further orders received

## 2020-12-31 NOTE — ED NOTES
Poison control called by RN, provider made aware of poison control recommendations. No orders received.

## 2020-12-31 NOTE — H&P
Internal Medicine History and Physical  Note           NAME:  Gardenia Rollins   :   1960   MRN:  [id number]     PCP:  None     Date/Time:  2020 3:07 PM      I hereby certify this patient for admission based upon medical necessity as noted below:        Assessment / plan :        Principal Problem:    Drug overdose (2020)    Active Problems:    Alcohol intoxication ([address] (2020)      Substance abuse ([facility] [address] (2020)      GERD (gastroesophageal reflux disease) (2020)      Tobacco abuse (2020)      Wheezes (2020)      Allergy to benzodiazepine (2020)       Admit to ICU- IVF banana back   CK level. Sedation if needed. PT/INR. Ammonia level/  Follow poison control center recs  Duoneb Nebs for wheezes, possible COPD   Further history from patient and exam when waking up   PPI  Fentanyl - one small dose iv might help her spasim state . Dw pharmacy    # Continue home regimen / Medications when appropriate. -DVT prophylaxis :  lovenox. - Code Status : FULL    -Other chronic medical problems as per past history. Further management depend on the course of the case and expanded data base. DISPO - pt to be admitted at this time for reasons addressed above, continued hospitalization for ongoing assessment and treatment indicated        Subjective:     CHIEF COMPLAINT: Unreposnvie     HISTORY OF PRESENT ILLNESS:     Ms. Jaclyn Bruce is a 64 y.o.  female who is admitted for Drug over dose . Ms. Jaclyn Bruce with past medical history as noted below , presented to the Emergency Department brought in by EMS unresponsive , she is non verbal non historian. Per ER MD and staff, she found to have used half bottle of ambien tab , also seems went into simi of alcohol as her alcohol level more than 200 at presentation.  According to her sister : she is not unusual for her to go into simi of alcohol , she smoke marijuana and her sister noted half amount in her ambien jar recently so probably used that amount of ambien. Urine drug screen negative . Poison control informed and advised admission as its might takes longer time to clear from her system. Given Narcan en route by EMS and in ER with no result to make her alert or awake. Informed that her sister lives with her and with other people and she called EMS. CT head and CXR on presentation un remarkable. I called  will Stephenson [phone] which reported NOK in her chart with no answer. Triage and ER notes noted. ER MD wanted to admit the patient to the hospital for further management and called hospitalist to facilitate this process. Past Medical History:   Diagnosis Date    Gastrointestinal disorder     GERD    Herpes         Past Surgical History:   Procedure Laterality Date    HX OTHER SURGICAL         Social History     Tobacco Use    Smoking status: Current Every Day Smoker     Packs/day: 0.50   Substance Use Topics    Alcohol use: Yes        History reviewed. No pertinent family history. Allergies   Allergen Reactions    Latex Hives    Valium [Diazepam] Swelling        Prior to Admission medications    Medication Sig Start Date End Date Taking? Authorizing Provider   alum-mag hydroxide-simeth (MYLANTA) 200-200-20 mg/5 mL susp Take 30 mL by mouth four (4) times daily as needed. 5/22/18   Va Rodriguez MD   ondansetron hcl (ZOFRAN, AS HYDROCHLORIDE,) 4 mg tablet Take 2 Tabs by mouth every eight (8) hours as needed for Nausea. 12/31/16   Jon Heredia PA   albuterol (PROVENTIL HFA, VENTOLIN HFA, PROAIR HFA) 90 mcg/actuation inhaler Take 1 Puff by inhalation every four (4) hours as needed for Wheezing. 12/31/16   SARWAT Mendez   dextromethorphan-guaiFENesin (ROBITUSSIN-DM)  mg/5 mL syrup Take 10 mL by mouth every six (6) hours as needed for Cough. 12/31/16   SARWAT Mendez   B.infantis-B.ani-B.long-B.bifi (PROBIOTIC 4X) 10-15 mg TbEC Take  by mouth.     Other, Phys, MD       Review of Systems: UNOBTAINABLE- patient is not responsive to verbal, tactile or mild pain stimuli      VITALS:    Vital signs reviewed; most recent are:    Visit Vitals  /75   Pulse 96   Temp 97.3 °F (36.3 °C)   Resp 16   Wt 70 kg (154 lb 4.8 oz)   SpO2 97%   BMI 26.49 kg/m²     SpO2 Readings from Last 6 Encounters:   12/31/20 97%   05/21/18 100%   12/30/16 98%   02/15/15 98%   04/11/14 95%        No intake or output data in the 24 hours ending 12/31/20 8165     Physical Exam:     Gen:   Appear stated age, Well-developed, in  some  acute distress  HEENT:  Head atraumatic, normocephalic , dry  mucous membranes. Neck:  Trachea midline , No apparent JVD, Supple   Resp: bilateral wheezes   No accessory muscle use,Bilateral BS present   Card:    normal S1, S2 without Gallop . No Significant lower leg peripheral edema. Abd:  Soft, non-tender, non-distended, bowel sounds are present . Musc:  No cyanosis or clubbing. Skin: multiple tattoos , Warm and dry . No rashes or ulcers, skin turgor is good. Neuro: unresponsive - closing eyes, not responding to mild painful , verbal or tactile stimuli , mild shaking, high tone limbs and bending trunk slightly. Unable to   follows commands appropriately. no clear area of focal motor weakness,       Labs: All Cardiac Markers in the last 24 hours: No results found for: CPK, CK, CKMMB, CKMB, RCK3, CKMBT, CKNDX, CKND1, TATIANA, TROPT, TROIQ, MIRIAM, TROPT, TNIPOC, BNP, BNPP    Recent Labs     12/31/20  1130   WBC 5.4   HGB 13.7   HCT 41.5        Recent Labs     12/31/20  1130      K 3.6   *   CO2 25   GLU 79   BUN 7   CREA 0.59*   CA 8.0*   ALB 3.4   TBILI 0.3   ALT 67*     Lab Results   Component Value Date/Time    Glucose (POC) 98 06/08/2012 07:43 AM    Glucose (POC) 118 (H) 06/07/2012 09:10 PM    Glucose, POC 89 11/02/2010 10:51 AM     No results for input(s): PH, PCO2, PO2, HCO3, FIO2 in the last 72 hours.   No results for input(s): INR, INREXT, INREXT in the last 72 hours. Ct Head Wo Cont    Result Date: 12/31/2020  IMPRESSION: No acute intracranial abnormality. _______________       Please refer to radiology reports. Risk of deterioration: high      Total time spent in the care of this patient: 79 [phone] [facility] discussed with: Nursing Staff and >50% of time spent in counseling and coordination of care      Discussed:  Code Status and D/C Planning       I have personally reviewed all pertinent labs, films and EKGs that have officially resulted. I reviewed available pertaining electronic documentation outlining the initial presentation as well as the emergency room physician's encounter. This document in whole or part of it has been produced using voice recognition software. Unrecognized errors in transcription may be present.     Attending Physician: Xavier Johnson MD

## 2020-12-31 NOTE — PROGRESS NOTES
Problem: Pain  Goal: *Control of Pain  Outcome: Progressing Towards Goal  Goal: *PALLIATIVE CARE:  Alleviation of Pain  Outcome: Progressing Towards Goal     Problem: Patient Education: Go to Patient Education Activity  Goal: Patient/Family Education  Outcome: Progressing Towards Goal     Problem: General Medical Care Plan  Goal: *Vital signs within specified parameters  Outcome: Progressing Towards Goal  Goal: *Labs within defined limits  Outcome: Progressing Towards Goal  Goal: *Absence of infection signs and symptoms  Outcome: Progressing Towards Goal  Goal: *Optimal pain control at patient's stated goal  Outcome: Progressing Towards Goal  Goal: *Skin integrity maintained  Outcome: Progressing Towards Goal  Goal: *Fluid volume balance  Outcome: Progressing Towards Goal  Goal: *Optimize nutritional status  Outcome: Progressing Towards Goal  Goal: *Anxiety reduced or absent  Outcome: Progressing Towards Goal  Goal: *Progressive mobility and function (eg: ADL's)  Outcome: Progressing Towards Goal     Problem: Patient Education: Go to Patient Education Activity  Goal: Patient/Family Education  Outcome: Progressing Towards Goal     Problem: Alcohol Withdrawal  Goal: *STG: Participates in treatment plan  Outcome: Progressing Towards Goal  Goal: *STG: Remains safe in hospital  Outcome: Progressing Towards Goal  Goal: *STG: Seeks staff when symptoms of withdrawal increase  Outcome: Progressing Towards Goal  Goal: *STG: Complies with medication therapy  Outcome: Progressing Towards Goal  Goal: *STG: Attends activities and groups  Outcome: Progressing Towards Goal  Goal: *STG: Will identify negative impact of chemical dependency including the use of tobacco, alcohol, and other substances  Outcome: Progressing Towards Goal  Goal: *STG: Verbalizes abstinence as an achievable goal  Outcome: Progressing Towards Goal  Goal: *STG: Agrees to participate in outpatient after care program to support ongoing mental health  Outcome: Progressing Towards Goal  Goal: *STG: Able to indentify relapse triggers including interpersonal/social and familial factors  Outcome: Progressing Towards Goal  Goal: *STG: Identify lifestyle changes to support long term sobriety such as vocation, employment, education, and legal issues  Outcome: Progressing Towards Goal  Goal: *STG: Maintains appropriate nutrition and hydration  Outcome: Progressing Towards Goal  Goal: *STG: Vital signs within defined limits  Outcome: Progressing Towards Goal  Goal: *STG/LTG: Relapse prevention plan in place to include housing/aftercare, leisure activities, and spirituality  Outcome: Progressing Towards Goal  Goal: Interventions  Outcome: Progressing Towards Goal     Problem: Patient Education: Go to Patient Education Activity  Goal: Patient/Family Education  Outcome: Progressing Towards Goal     Problem: Falls - Risk of  Goal: *Absence of Falls  Description: Document Catina Fall Risk and appropriate interventions in the flowsheet. Outcome: Progressing Towards Goal  Note: Fall Risk Interventions:       Mentation Interventions: Bed/chair exit alarm, Door open when patient unattended    Medication Interventions: Bed/chair exit alarm, Evaluate medications/consider consulting pharmacy    Elimination Interventions: Bed/chair exit alarm, Call light in reach    History of Falls Interventions: Bed/chair exit alarm, Door open when patient unattended    Problem: Patient Education: Go to Patient Education Activity  Goal: Patient/Family Education  Outcome: Progressing Towards Goal     Problem: Pressure Injury - Risk of  Goal: *Prevention of pressure injury  Description: Document Lavell Scale and appropriate interventions in the flowsheet.   Outcome: Progressing Towards Goal  Note: Pressure Injury Interventions:  Sensory Interventions: Assess changes in LOC    Moisture Interventions: Absorbent underpads, Apply protective barrier, creams and emollients    Activity Interventions: Pressure redistribution bed/mattress(bed type)    Mobility Interventions: HOB 30 degrees or less, Pressure redistribution bed/mattress (bed type)    Nutrition Interventions: Document food/fluid/supplement intake      Problem: Patient Education: Go to Patient Education Activity  Goal: Patient/Family Education  Outcome: Progressing Towards Goal

## 2020-12-31 NOTE — ED NOTES
Pt transported to [facility] for admission. Pt received by Lima Burns. Pt in no distress at time of admission.

## 2021-01-01 VITALS
TEMPERATURE: 98.3 F | DIASTOLIC BLOOD PRESSURE: 88 MMHG | RESPIRATION RATE: 16 BRPM | OXYGEN SATURATION: 99 % | HEART RATE: 97 BPM | HEIGHT: 64 IN | WEIGHT: 139.4 LBS | BODY MASS INDEX: 23.8 KG/M2 | SYSTOLIC BLOOD PRESSURE: 124 MMHG

## 2021-01-01 LAB
ALBUMIN SERPL-MCNC: 3.2 G/DL (ref 3.4–5)
ALBUMIN/GLOB SERPL: 0.9 {RATIO} (ref 0.8–1.7)
ALP SERPL-CCNC: 136 U/L (ref 45–117)
ALT SERPL-CCNC: 56 U/L (ref 13–56)
AMMONIA PLAS-SCNC: 24 UMOL/L (ref 11–32)
ANION GAP SERPL CALC-SCNC: 7 MMOL/L (ref 3–18)
AST SERPL-CCNC: 52 U/L (ref 10–38)
ATRIAL RATE: 82 BPM
BASOPHILS # BLD: 0 K/UL (ref 0–0.1)
BASOPHILS NFR BLD: 1 % (ref 0–2)
BILIRUB DIRECT SERPL-MCNC: 0.2 MG/DL (ref 0–0.2)
BILIRUB SERPL-MCNC: 0.4 MG/DL (ref 0.2–1)
BUN SERPL-MCNC: 6 MG/DL (ref 7–18)
BUN/CREAT SERPL: 8 (ref 12–20)
CALCIUM SERPL-MCNC: 7.9 MG/DL (ref 8.5–10.1)
CALCULATED P AXIS, ECG09: 42 DEGREES
CALCULATED R AXIS, ECG10: 51 DEGREES
CALCULATED T AXIS, ECG11: 50 DEGREES
CHLORIDE SERPL-SCNC: 107 MMOL/L (ref 100–111)
CK SERPL-CCNC: 61 U/L (ref 26–192)
CO2 SERPL-SCNC: 23 MMOL/L (ref 21–32)
CREAT SERPL-MCNC: 0.73 MG/DL (ref 0.6–1.3)
DIAGNOSIS, 93000: NORMAL
DIFFERENTIAL METHOD BLD: ABNORMAL
EOSINOPHIL # BLD: 0.1 K/UL (ref 0–0.4)
EOSINOPHIL NFR BLD: 1 % (ref 0–5)
ERYTHROCYTE [DISTWIDTH] IN BLOOD BY AUTOMATED COUNT: 12.9 % (ref 11.6–14.5)
GLOBULIN SER CALC-MCNC: 3.5 G/DL (ref 2–4)
GLUCOSE SERPL-MCNC: 108 MG/DL (ref 74–99)
HCT VFR BLD AUTO: 37.2 % (ref 35–45)
HGB BLD-MCNC: 12.1 G/DL (ref 12–16)
INR PPP: 1.1 (ref 0.8–1.2)
LIPASE SERPL-CCNC: 121 U/L (ref 73–393)
LYMPHOCYTES # BLD: 2 K/UL (ref 0.9–3.6)
LYMPHOCYTES NFR BLD: 29 % (ref 21–52)
MAGNESIUM SERPL-MCNC: 1.8 MG/DL (ref 1.6–2.6)
MCH RBC QN AUTO: 33.4 PG (ref 24–34)
MCHC RBC AUTO-ENTMCNC: 32.5 G/DL (ref 31–37)
MCV RBC AUTO: 102.8 FL (ref 74–97)
MONOCYTES # BLD: 0.3 K/UL (ref 0.05–1.2)
MONOCYTES NFR BLD: 5 % (ref 3–10)
NEUTS SEG # BLD: 4.5 K/UL (ref 1.8–8)
NEUTS SEG NFR BLD: 64 % (ref 40–73)
P-R INTERVAL, ECG05: 138 MS
PHOSPHATE SERPL-MCNC: 2.5 MG/DL (ref 2.5–4.9)
PLATELET # BLD AUTO: 155 K/UL (ref 135–420)
PMV BLD AUTO: 10.7 FL (ref 9.2–11.8)
POTASSIUM SERPL-SCNC: 3.5 MMOL/L (ref 3.5–5.5)
PROT SERPL-MCNC: 6.7 G/DL (ref 6.4–8.2)
PROTHROMBIN TIME: 14.2 SEC (ref 11.5–15.2)
Q-T INTERVAL, ECG07: 390 MS
QRS DURATION, ECG06: 80 MS
QTC CALCULATION (BEZET), ECG08: 455 MS
RBC # BLD AUTO: 3.62 M/UL (ref 4.2–5.3)
SODIUM SERPL-SCNC: 137 MMOL/L (ref 136–145)
VENTRICULAR RATE, ECG03: 82 BPM
WBC # BLD AUTO: 6.9 K/UL (ref 4.6–13.2)

## 2021-01-01 PROCEDURE — 85025 COMPLETE CBC W/AUTO DIFF WBC: CPT

## 2021-01-01 PROCEDURE — 83735 ASSAY OF MAGNESIUM: CPT

## 2021-01-01 PROCEDURE — 94640 AIRWAY INHALATION TREATMENT: CPT

## 2021-01-01 PROCEDURE — 2709999900 HC NON-CHARGEABLE SUPPLY

## 2021-01-01 PROCEDURE — 83690 ASSAY OF LIPASE: CPT

## 2021-01-01 PROCEDURE — 80048 BASIC METABOLIC PNL TOTAL CA: CPT

## 2021-01-01 PROCEDURE — 74011000250 HC RX REV CODE- 250: Performed by: INTERNAL MEDICINE

## 2021-01-01 PROCEDURE — 74011250636 HC RX REV CODE- 250/636: Performed by: INTERNAL MEDICINE

## 2021-01-01 PROCEDURE — 84100 ASSAY OF PHOSPHORUS: CPT

## 2021-01-01 PROCEDURE — 80076 HEPATIC FUNCTION PANEL: CPT

## 2021-01-01 PROCEDURE — 82550 ASSAY OF CK (CPK): CPT

## 2021-01-01 PROCEDURE — 82140 ASSAY OF AMMONIA: CPT

## 2021-01-01 PROCEDURE — 85610 PROTHROMBIN TIME: CPT

## 2021-01-01 RX ADMIN — FAMOTIDINE 20 MG: 10 INJECTION INTRAVENOUS at 08:45

## 2021-01-01 RX ADMIN — IPRATROPIUM BROMIDE AND ALBUTEROL SULFATE 3 ML: .5; 3 SOLUTION RESPIRATORY (INHALATION) at 13:08

## 2021-01-01 RX ADMIN — IPRATROPIUM BROMIDE AND ALBUTEROL SULFATE 3 ML: .5; 3 SOLUTION RESPIRATORY (INHALATION) at 08:48

## 2021-01-01 RX ADMIN — ENOXAPARIN SODIUM 40 MG: 40 INJECTION SUBCUTANEOUS at 08:45

## 2021-01-01 RX ADMIN — Medication 10 ML: at 06:19

## 2021-01-01 NOTE — PROGRESS NOTES
Problem: Pain  Goal: *Control of Pain  Outcome: Progressing Towards Goal  Goal: *PALLIATIVE CARE:  Alleviation of Pain  Outcome: Progressing Towards Goal     Problem: Patient Education: Go to Patient Education Activity  Goal: Patient/Family Education  Outcome: Progressing Towards Goal     Problem: General Medical Care Plan  Goal: *Vital signs within specified parameters  Outcome: Progressing Towards Goal  Goal: *Labs within defined limits  Outcome: Progressing Towards Goal  Goal: *Absence of infection signs and symptoms  Outcome: Progressing Towards Goal  Goal: *Optimal pain control at patient's stated goal  Outcome: Progressing Towards Goal  Goal: *Skin integrity maintained  Outcome: Progressing Towards Goal  Goal: *Fluid volume balance  Outcome: Progressing Towards Goal  Goal: *Optimize nutritional status  Outcome: Progressing Towards Goal  Goal: *Anxiety reduced or absent  Outcome: Progressing Towards Goal  Goal: *Progressive mobility and function (eg: ADL's)  Outcome: Progressing Towards Goal     Problem: Patient Education: Go to Patient Education Activity  Goal: Patient/Family Education  Outcome: Progressing Towards Goal     Problem: Alcohol Withdrawal  Goal: *STG: Participates in treatment plan  Outcome: Progressing Towards Goal  Goal: *STG: Remains safe in hospital  Outcome: Progressing Towards Goal  Goal: *STG: Seeks staff when symptoms of withdrawal increase  Outcome: Progressing Towards Goal  Goal: *STG: Complies with medication therapy  Outcome: Progressing Towards Goal  Goal: *STG: Attends activities and groups  Outcome: Progressing Towards Goal  Goal: *STG: Will identify negative impact of chemical dependency including the use of tobacco, alcohol, and other substances  Outcome: Progressing Towards Goal  Goal: *STG: Verbalizes abstinence as an achievable goal  Outcome: Progressing Towards Goal  Goal: *STG: Agrees to participate in outpatient after care program to support ongoing mental health  Outcome: Progressing Towards Goal  Goal: *STG: Able to indentify relapse triggers including interpersonal/social and familial factors  Outcome: Progressing Towards Goal  Goal: *STG: Identify lifestyle changes to support long term sobriety such as vocation, employment, education, and legal issues  Outcome: Progressing Towards Goal  Goal: *STG: Maintains appropriate nutrition and hydration  Outcome: Progressing Towards Goal  Goal: *STG: Vital signs within defined limits  Outcome: Progressing Towards Goal  Goal: *STG/LTG: Relapse prevention plan in place to include housing/aftercare, leisure activities, and spirituality  Outcome: Progressing Towards Goal  Goal: Interventions  Outcome: Progressing Towards Goal     Problem: Patient Education: Go to Patient Education Activity  Goal: Patient/Family Education  Outcome: Progressing Towards Goal     Problem: Falls - Risk of  Goal: *Absence of Falls  Description: Document Catina Fall Risk and appropriate interventions in the flowsheet. Outcome: Progressing Towards Goal  Note: Fall Risk Interventions:       Mentation Interventions: Bed/chair exit alarm, Door open when patient unattended    Medication Interventions: Bed/chair exit alarm, Evaluate medications/consider consulting pharmacy    Elimination Interventions: Bed/chair exit alarm, Call light in reach    History of Falls Interventions: Bed/chair exit alarm, Door open when patient unattended         Problem: Patient Education: Go to Patient Education Activity  Goal: Patient/Family Education  Outcome: Progressing Towards Goal     Problem: Pressure Injury - Risk of  Goal: *Prevention of pressure injury  Description: Document Lavell Scale and appropriate interventions in the flowsheet.   Outcome: Progressing Towards Goal     Problem: Patient Education: Go to Patient Education Activity  Goal: Patient/Family Education  Outcome: Progressing Towards Goal

## 2021-01-01 NOTE — PROGRESS NOTES
Problem: Discharge Planning  Goal: *DISCHARGE TO SAFE ENVIRONMENT  Outcome: Progressing Towards Goal   Reason for Admission:   Drug Overdose                   RUR Score:       15              Plan for utilizing home health:   n/a       PCP: First and Last name:  SARWAT Stern at [facility]   Name of Practice:    Are you a current patient: Yes/No: yes   Approximate date of last visit: 2 mo   Can you participate in a virtual visit with your PCP:                     Current Advanced Directive/Advance Care Plan:                        None. educated  Transition of Care Plan:                    Interviewed pt and verified demographics. Independent ADL and no DME. Dr Jonathan Mcfarlane will speak with pt and decide if Psych consult needed. If not, plan is Home and spouse will     Care Management Interventions  PCP Verified by CM:  Yes  Mode of Transport at Discharge: Self  Transition of Care Consult (CM Consult): Discharge Planning  Current Support Network: Lives with Spouse, Own Home  Confirm Follow Up Transport: Self  Discharge Location  Discharge Placement: Home

## 2021-01-01 NOTE — PROGRESS NOTES
1900 Bedside shift change report given to [address] Stanley (oncoming nurse) by Mao Martinez (offgoing nurse). Report included the following information SBAR, Kardex, Procedure Summary, Intake/Output, MAR, Accordion, Recent Results, Med Rec Status, Cardiac Rhythm NSR and Alarm Parameters . 2000 Assessment. A&Ox1 to self. Drowsy. Diminished bilat. Following commands. 2200 Bed alarm, PT attempting to get out of bed. Able to re orientate. Placed on bedpan, voided 400cc clear/yellow urine. [address] control updated. 2330 Lactic 2.9. Dr. Timmy Cotter contacted. No further orders provided. 0000 Assessment. No changes. 0300 Up to commode. Voided 400cc. Tolerated activity well with x1 assist.     0400 Assessment. A&Ox4, calm and cooperative. No pain. VSS    0530 PT  called unit, updated on status. 0600 PT able to call  and update on status. [id number] control contacted and updated. 0700 Bedside shift change report given to Sherlyn Turner  (oncoming nurse) by [address] Stanley (offgoing nurse). Report included the following information SBAR, Kardex, Procedure Summary, Intake/Output, MAR, Accordion, Recent Results, Med Rec Status, Cardiac Rhythm NSR and Alarm Parameters .

## 2021-01-01 NOTE — PROGRESS NOTES
Problem: Pain  Goal: *Control of Pain  Outcome: Resolved/Met  Goal: *PALLIATIVE CARE:  Alleviation of Pain  Outcome: Resolved/Met     Problem: Patient Education: Go to Patient Education Activity  Goal: Patient/Family Education  Outcome: Resolved/Met     Problem: General Medical Care Plan  Goal: *Vital signs within specified parameters  Outcome: Resolved/Met  Goal: *Labs within defined limits  Outcome: Resolved/Met  Goal: *Absence of infection signs and symptoms  Outcome: Resolved/Met  Goal: *Optimal pain control at patient's stated goal  Outcome: Resolved/Met  Goal: *Skin integrity maintained  Outcome: Resolved/Met  Goal: *Fluid volume balance  Outcome: Resolved/Met  Goal: *Optimize nutritional status  Outcome: Resolved/Met  Goal: *Anxiety reduced or absent  Outcome: Resolved/Met  Goal: *Progressive mobility and function (eg: ADL's)  Outcome: Resolved/Met     Problem: Patient Education: Go to Patient Education Activity  Goal: Patient/Family Education  Outcome: Resolved/Met     Problem: Alcohol Withdrawal  Goal: *STG: Participates in treatment plan  Outcome: Resolved/Met  Goal: *STG: Remains safe in hospital  Outcome: Resolved/Met  Goal: *STG: Seeks staff when symptoms of withdrawal increase  Outcome: Resolved/Met  Goal: *STG: Complies with medication therapy  Outcome: Resolved/Met  Goal: *STG: Attends activities and groups  Outcome: Resolved/Met  Goal: *STG: Will identify negative impact of chemical dependency including the use of tobacco, alcohol, and other substances  Outcome: Resolved/Met  Goal: *STG: Verbalizes abstinence as an achievable goal  Outcome: Resolved/Met  Goal: *STG: Agrees to participate in outpatient after care program to support ongoing mental health  Outcome: Resolved/Met  Goal: *STG: Able to indentify relapse triggers including interpersonal/social and familial factors  Outcome: Resolved/Met  Goal: *STG: Identify lifestyle changes to support long term sobriety such as vocation, employment, education, and legal issues  Outcome: Resolved/Met  Goal: *STG: Maintains appropriate nutrition and hydration  Outcome: Resolved/Met  Goal: *STG: Vital signs within defined limits  Outcome: Resolved/Met  Goal: *STG/LTG: Relapse prevention plan in place to include housing/aftercare, leisure activities, and spirituality  Outcome: Resolved/Met  Goal: Interventions  Outcome: Resolved/Met     Problem: Patient Education: Go to Patient Education Activity  Goal: Patient/Family Education  Outcome: Resolved/Met     Problem: Falls - Risk of  Goal: *Absence of Falls  Description: Document Catina Fall Risk and appropriate interventions in the flowsheet. Outcome: Resolved/Met  Note: Fall Risk Interventions:  Mobility Interventions: Bed/chair exit alarm, Patient to call before getting OOB    Mentation Interventions: Adequate sleep, hydration, pain control, Door open when patient unattended, Eyeglasses and hearing aids    Medication Interventions: Patient to call before getting OOB, Teach patient to arise slowly    Elimination Interventions: Toileting schedule/hourly rounds    History of Falls Interventions: Room close to nurse's station         Problem: Patient Education: Go to Patient Education Activity  Goal: Patient/Family Education  Outcome: Resolved/Met     Problem: Pressure Injury - Risk of  Goal: *Prevention of pressure injury  Description: Document Lavell Scale and appropriate interventions in the flowsheet.   Outcome: Resolved/Met  Note: Pressure Injury Interventions:  Sensory Interventions: Assess changes in LOC    Moisture Interventions: Minimize layers    Activity Interventions: Increase time out of bed    Mobility Interventions: HOB 30 degrees or less    Nutrition Interventions: Document food/fluid/supplement intake                     Problem: Patient Education: Go to Patient Education Activity  Goal: Patient/Family Education  Outcome: Resolved/Met     Problem: Discharge Planning  Goal: *DISCHARGE TO SAFE ENVIRONMENT  Outcome: Resolved/Met

## 2021-01-01 NOTE — DISCHARGE SUMMARY
Internal Medicine Discharge Summary        Patient: Shar Wood    YOB: 1960    Age:  64 y.o. Admit Date: 12/31/2020    Discharge Date: 1/1/2021    LOS:  LOS: 1 day     Discharge To: Home    Consults: None    Admission Diagnoses: Drug overdose [T50.901A]    Discharge Diagnoses:    Problem List as of 1/1/2021 Never Reviewed          Codes Class Noted - Resolved    * (Principal) Drug overdose ICD-10-CM: T50.901A  ICD-9-CM: 977.9, E980.5  12/31/2020 - Present        Alcohol intoxication ([facility] ICD-10-CM: L80.306  ICD-9-CM: 305.00  12/31/2020 - Present        Substance abuse ([facility] ICD-10-CM: F19.10  ICD-9-CM: 305.90  12/31/2020 - Present        GERD (gastroesophageal reflux disease) ICD-10-CM: K21.9  ICD-9-CM: 530.81  12/31/2020 - Present        Tobacco abuse ICD-10-CM: Z72.0  ICD-9-CM: 305.1  12/31/2020 - Present        Wheezes ICD-10-CM: R06.2  ICD-9-CM: 786.07  12/31/2020 - Present        Allergy to benzodiazepine ICD-10-CM: Z88.8  ICD-9-CM: V14.8  12/31/2020 - Present              Discharge Condition:  Improved    Procedures: None         HPI: Ms. Jose Scales is a 64 y.o.  female who is admitted for Drug over dose . Ms. Jose Scales with past medical history as noted below , presented to the Emergency Department brought in by EMS unresponsive , she is non verbal non historian. Per ER MD and staff, she found to have used half bottle of ambien tab , also seems went into simi of alcohol as her alcohol level more than 200 at presentation. According to her sister : she is not unusual for her to go into simi of alcohol , she smoke marijuana and her sister noted half amount in her ambien jar recently so probably used that amount of ambien. Urine drug screen negative . Poison control informed and advised admission as its might takes longer time to clear from her system. Given Narcan en route by EMS and in ER with no result to make her alert or awake.   Informed that her sister lives with her and with other people and she called EMS. CT head and CXR on presentation un remarkable. I called  will Stephenson [phone] which reported NOK in her chart with no answer. Triage and ER notes noted. ER MD wanted to admit the patient to the hospital for further management and called hospitalist to facilitate this process. Hospital Course:    Acute toxic encephalopathy 2/2 Alcohol/Marijuana possible Ambien - Patient back to baseline the following morning and eager to get home. States she is not sure exactly what happened, but admitted to drinking a good amount and smoking marijuana. She thinks it is possible she may have taken some ambien. She denied emphatically any thoughts of suicide or intentional overdose. Labs were otherwise WNL other than mildly elevated alkphos. No further inpatient monitoring needed. Discussed alcohol/drug cessation    The rest of the patient's chronic conditions were managed appropriately during their admission. They were medically stable at the time of discharge.     Visit Vitals  /88 (BP 1 Location: Left arm)   Pulse 97   Temp 98.3 °F (36.8 °C)   Resp 16   Ht 5' 4\" (1.626 m)   Wt 63.2 kg (139 lb 6.4 oz)   SpO2 99%   BMI 23.93 kg/m²       Physical Exam at Discharge:  General Appearance: NAD, conversant  HENT: normocephalic/atraumatic, moist mucus membranes  Lungs: CTA with normal respiratory effort  CV: RRR, no m/r/g  Abdomen: soft, non-tender, normal bowel sounds  Extremities: no cyanosis, no peripheral edema  Neuro: moves all extremities, no focal deficits  Psych: appropriate affect, alert and oriented to person, place and time    Labs Prior to Discharge:  Labs: Results:       Chemistry Recent Labs     01/01/21  0415 12/31/20  2109 12/31/20  1130   *  --  79     --  143   K 3.5  --  3.6     --  113*   CO2 23  --  25   BUN 6*  --  7   CREA 0.73  --  0.59*   CA 7.9*  --  8.0*   AGAP 7  --  5   BUCR 8*  --  12   * 130* 141*   TP 6.7 6.2* 6.8   ALB 3. 2* 3.0* 3.4   GLOB 3.5 3.2 3.4   AGRAT 0.9 0.9 1.0      CBC w/Diff Recent Labs     01/01/21  0415 12/31/20  1130   WBC 6.9 5.4   RBC 3.62* 4.03*   HGB 12.1 13.7   HCT 37.2 41.5    171   GRANS 64 52   LYMPH 29 40   EOS 1 2      Cardiac Enzymes Recent Labs     01/01/21  0415 12/31/20  2109   CPK 61 46      Coagulation Recent Labs     01/01/21 0415   PTP 14.2   INR 1.1       Lipid Panel Lab Results   Component Value Date/Time    Cholesterol, total 152 05/30/2012 03:40 PM    HDL Cholesterol 51 05/30/2012 03:40 PM    LDL, calculated 76.4 05/30/2012 03:40 PM    VLDL, calculated 24.6 05/30/2012 03:40 PM    Triglyceride 123 05/30/2012 03:40 PM    CHOL/HDL Ratio 3.0 05/30/2012 03:40 PM      BNP No results for input(s): BNPP in the last 72 hours. Liver Enzymes Recent Labs     01/01/21 0415   TP 6.7   ALB 3.2*   *      Thyroid Studies Lab Results   Component Value Date/Time    TSH 2.84 05/30/2012 03:40 PM            Significant Imaging:  Ct Head Wo Cont    Result Date: 12/31/2020  _______________ EXAM: CT HEAD WO CONT. _______________ CLINICAL INDICATION/HISTORY: Unresponsive suspect drug overdose. -Additional: None. COMPARISON: CT of 02/15/2015. _______________ TECHNIQUE/COMPARISON: Nonenhanced CT examination of the head was performed. Sagittal and coronal series were reformatted from the axial source images. One or more dose reduction techniques were used on this CT: automated exposure control, adjustment of the mAs and/or kVp according to patient size, and iterative reconstruction techniques. The specific techniques used on this CT exam have been documented in the patient's electronic medical record. Digital Imaging and Communications in Medicine (DICOM) format image data are available to nonaffiliated external healthcare facilities or entities on a secure, media free, reciprocally searchable basis with patient authorization for at least a 12-month period after this study.  _______________ FINDINGS: BRAIN AND POSTERIOR FOSSA: There is no evidence of acute vascular territorial ischemia, intracranial hemorrhage, midline shift or mass effect. The ventricles and sulci are within normal limits for the patient's age. EXTRA-AXIAL SPACES: There are no abnormal extra-axial fluid collections. CALVARIA AND SOFT TISSUES: No acute calvarial or extracalvarial soft tissue findings of concern. OTHER: The visualized paranasal sinuses are essentially clear, as are the mastoid air cells bilaterally. _______________     IMPRESSION: No acute intracranial abnormality. _______________            Discharge Medications:     Discharge Medication List as of 1/1/2021  3:26 PM      CONTINUE these medications which have NOT CHANGED    Details   gabapentin (NEURONTIN) 100 mg capsule Take 100 mg by mouth three (3) times daily. Indications: neuropathic pain, Historical Med      ondansetron hcl (ZOFRAN, AS HYDROCHLORIDE,) 4 mg tablet Take 2 Tabs by mouth every eight (8) hours as needed for Nausea. , Print, Disp-12 Tab, R-0      albuterol (PROVENTIL HFA, VENTOLIN HFA, PROAIR HFA) 90 mcg/actuation inhaler Take 1 Puff by inhalation every four (4) hours as needed for Wheezing., Print, Disp-1 Inhaler, R-0         STOP taking these medications       zolpidem (Ambien) 10 mg tablet Comments:   Reason for Stopping:         alum-mag hydroxide-simeth (MYLANTA) 200-200-20 mg/5 mL susp Comments:   Reason for Stopping:         dextromethorphan-guaiFENesin (ROBITUSSIN-DM)  mg/5 mL syrup Comments:   Reason for Stopping:         B.infantis-B.ani-B.long-B.bifi (PROBIOTIC 4X) 10-15 mg TbEC Comments:   Reason for Stopping:               Activity: Activity as tolerated    Diet: Resume previous diet    Wound Care: None needed    Follow-up:   Please follow up with your PCP within 7 days to discuss your recent hospitalization. Patient to arrange.          Total time spent including time spent on final examination and discharge discussion, discharge documentation and records reviewed and medication reconciliation: > 30 minutes    Melba Hernandez DO  Internal Medicine, Hospitalist  Pager: [phone] [facility]

## 2021-06-21 NOTE — PROGRESS NOTES
Received pt from Dottie Felice, [facility] [place]. Pt awake, alert. 32 61 16: Pt received all d/c instructions written and verbal. She has no further questions. IV d/c'd, catheter intact. VSS. 32 61 16: Pt d/c'd via wheelchair by Jonathan Guadarrama RN. Pt confirms possession of personal belongings including cell phone, , eye glasses, pendant, earrings, and identification & bank cards. She has no further questions. yes

## 2022-03-18 PROBLEM — K21.9 GERD (GASTROESOPHAGEAL REFLUX DISEASE): Status: ACTIVE | Noted: 2020-12-31

## 2022-03-18 PROBLEM — F10.929 ALCOHOL INTOXICATION (HCC): Status: ACTIVE | Noted: 2020-12-31

## 2022-03-19 PROBLEM — Z72.0 TOBACCO ABUSE: Status: ACTIVE | Noted: 2020-12-31

## 2022-03-19 PROBLEM — R06.2 WHEEZES: Status: ACTIVE | Noted: 2020-12-31

## 2022-03-19 PROBLEM — F19.10 SUBSTANCE ABUSE (HCC): Status: ACTIVE | Noted: 2020-12-31

## 2022-03-19 PROBLEM — Z88.8: Status: ACTIVE | Noted: 2020-12-31

## 2022-03-20 PROBLEM — T50.901A DRUG OVERDOSE: Status: ACTIVE | Noted: 2020-12-31

## 2025-01-08 ENCOUNTER — HOSPITAL ENCOUNTER (OUTPATIENT)
Facility: HOSPITAL | Age: 65
Setting detail: SPECIMEN
Discharge: HOME | End: 2025-01-11

## 2025-01-08 PROCEDURE — 83615 LACTATE (LD) (LDH) ENZYME: CPT

## 2025-01-08 PROCEDURE — 82042 OTHER SOURCE ALBUMIN QUAN EA: CPT

## 2025-01-08 PROCEDURE — 82945 GLUCOSE OTHER FLUID: CPT

## 2025-01-08 PROCEDURE — 84157 ASSAY OF PROTEIN OTHER: CPT

## 2025-01-08 PROCEDURE — 82570 ASSAY OF URINE CREATININE: CPT

## 2025-01-09 LAB
ALBUMIN FLD-MCNC: 0.9 G/DL
CREAT FLD-MCNC: 0.46 MG/DL
GLUCOSE FLD-MCNC: 113 MG/DL
LDH FLD L TO P-CCNC: 52 U/L
PROT FLD-MCNC: 3.3 G/DL
SPECIMEN SOURCE FLD: NORMAL

## 2025-02-07 RX ORDER — SIMETHICONE 80 MG
80 TABLET,CHEWABLE ORAL
COMMUNITY
Start: 2025-01-12 | End: 2026-01-12

## 2025-02-07 RX ORDER — FOLIC ACID 1 MG/1
1 TABLET ORAL
COMMUNITY
Start: 2025-01-12

## 2025-02-07 RX ORDER — FUROSEMIDE 40 MG/1
40 TABLET ORAL
COMMUNITY
Start: 2025-01-12

## 2025-02-07 RX ORDER — SPIRONOLACTONE 50 MG/1
100 TABLET, FILM COATED ORAL
COMMUNITY
Start: 2025-01-12

## 2025-02-07 RX ORDER — LIDOCAINE 50 MG/G
PATCH TOPICAL
COMMUNITY
Start: 2025-01-12

## 2025-02-07 RX ORDER — OMEPRAZOLE 40 MG/1
40 CAPSULE, DELAYED RELEASE ORAL
COMMUNITY
Start: 2025-01-12

## 2025-02-07 RX ORDER — SUCRALFATE 1 G/1
1 TABLET ORAL
COMMUNITY
Start: 2025-01-12

## 2025-02-07 RX ORDER — ZINC SULFATE 50(220)MG
220 CAPSULE ORAL
COMMUNITY
Start: 2025-01-12

## 2025-02-10 ENCOUNTER — OFFICE VISIT (OUTPATIENT)
Age: 65
End: 2025-02-10

## 2025-02-10 ENCOUNTER — HOSPITAL ENCOUNTER (OUTPATIENT)
Facility: HOSPITAL | Age: 65
Setting detail: SPECIMEN
Discharge: HOME | End: 2025-02-13
Payer: OTHER GOVERNMENT

## 2025-02-10 VITALS
TEMPERATURE: 99.4 F | HEART RATE: 94 BPM | WEIGHT: 97.8 LBS | BODY MASS INDEX: 17.33 KG/M2 | OXYGEN SATURATION: 99 % | DIASTOLIC BLOOD PRESSURE: 64 MMHG | HEIGHT: 63 IN | SYSTOLIC BLOOD PRESSURE: 96 MMHG

## 2025-02-10 DIAGNOSIS — K70.30 ALCOHOLIC CIRRHOSIS OF LIVER WITHOUT ASCITES (HCC): ICD-10-CM

## 2025-02-10 DIAGNOSIS — R60.0 BILATERAL LOWER EXTREMITY EDEMA: ICD-10-CM

## 2025-02-10 DIAGNOSIS — F10.91 ALCOHOL USE DISORDER IN REMISSION: ICD-10-CM

## 2025-02-10 DIAGNOSIS — D69.6 THROMBOCYTOPENIA: ICD-10-CM

## 2025-02-10 DIAGNOSIS — K70.30 ALCOHOLIC CIRRHOSIS OF LIVER WITHOUT ASCITES (HCC): Primary | ICD-10-CM

## 2025-02-10 DIAGNOSIS — K70.9 ALCOHOL INDUCED LIVER DISORDER: ICD-10-CM

## 2025-02-10 LAB
ALBUMIN SERPL-MCNC: 3.3 G/DL (ref 3.4–5)
ALBUMIN/GLOB SERPL: 0.5 (ref 0.8–1.7)
ALP SERPL-CCNC: 178 U/L (ref 45–117)
ALT SERPL-CCNC: 19 U/L (ref 13–56)
ANION GAP SERPL CALC-SCNC: 4 MMOL/L (ref 3–18)
AST SERPL-CCNC: 51 U/L (ref 10–38)
BASOPHILS # BLD: 0.06 K/UL (ref 0–0.1)
BASOPHILS NFR BLD: 1.3 % (ref 0–2)
BILIRUB DIRECT SERPL-MCNC: 1.5 MG/DL (ref 0–0.2)
BILIRUB SERPL-MCNC: 2.7 MG/DL (ref 0.2–1)
BUN SERPL-MCNC: 16 MG/DL (ref 7–18)
BUN/CREAT SERPL: 20 (ref 12–20)
CALCIUM SERPL-MCNC: 9 MG/DL (ref 8.5–10.1)
CHLORIDE SERPL-SCNC: 101 MMOL/L (ref 100–111)
CO2 SERPL-SCNC: 27 MMOL/L (ref 21–32)
CREAT SERPL-MCNC: 0.79 MG/DL (ref 0.6–1.3)
DIFFERENTIAL METHOD BLD: ABNORMAL
EOSINOPHIL # BLD: 0.06 K/UL (ref 0–0.4)
EOSINOPHIL NFR BLD: 1.3 % (ref 0–5)
ERYTHROCYTE [DISTWIDTH] IN BLOOD BY AUTOMATED COUNT: 16.2 % (ref 11.6–14.5)
FERRITIN SERPL-MCNC: 112 NG/ML (ref 8–388)
GLOBULIN SER CALC-MCNC: 6.4 G/DL (ref 2–4)
GLUCOSE SERPL-MCNC: 138 MG/DL (ref 74–99)
HCT VFR BLD AUTO: 26.2 % (ref 35–45)
HGB BLD-MCNC: 8.8 G/DL (ref 12–16)
IMM GRANULOCYTES # BLD AUTO: 0.01 K/UL (ref 0–0.04)
IMM GRANULOCYTES NFR BLD AUTO: 0.2 % (ref 0–0.5)
INR PPP: 1.5 (ref 0.9–1.1)
IRON SATN MFR SERPL: 45 % (ref 20–50)
IRON SERPL-MCNC: 102 UG/DL (ref 50–175)
LYMPHOCYTES # BLD: 1.12 K/UL (ref 0.9–3.3)
LYMPHOCYTES NFR BLD: 25 % (ref 21–52)
MCH RBC QN AUTO: 36.1 PG (ref 24–34)
MCHC RBC AUTO-ENTMCNC: 33.6 G/DL (ref 31–37)
MCV RBC AUTO: 107.4 FL (ref 78–100)
MONOCYTES # BLD: 0.27 K/UL (ref 0.05–1.2)
MONOCYTES NFR BLD: 6 % (ref 3–10)
NEUTS SEG # BLD: 2.96 K/UL (ref 1.8–8)
NEUTS SEG NFR BLD: 66.2 % (ref 40–73)
NRBC # BLD: 0 K/UL (ref 0–0.01)
NRBC BLD-RTO: 0 PER 100 WBC
PLATELET # BLD AUTO: 110 K/UL (ref 135–420)
PMV BLD AUTO: 10.4 FL (ref 9.2–11.8)
POTASSIUM SERPL-SCNC: 4.2 MMOL/L (ref 3.5–5.5)
PROT SERPL-MCNC: 9.7 G/DL (ref 6.4–8.2)
PROTHROMBIN TIME: 18.3 SEC (ref 11.9–14.9)
RBC # BLD AUTO: 2.44 M/UL (ref 4.2–5.3)
SODIUM SERPL-SCNC: 132 MMOL/L (ref 136–145)
TIBC SERPL-MCNC: 228 UG/DL (ref 250–450)
WBC # BLD AUTO: 4.5 K/UL (ref 4.6–13.2)

## 2025-02-10 PROCEDURE — 83540 ASSAY OF IRON: CPT

## 2025-02-10 PROCEDURE — 82103 ALPHA-1-ANTITRYPSIN TOTAL: CPT

## 2025-02-10 PROCEDURE — 87340 HEPATITIS B SURFACE AG IA: CPT

## 2025-02-10 PROCEDURE — 82728 ASSAY OF FERRITIN: CPT

## 2025-02-10 PROCEDURE — 36415 COLL VENOUS BLD VENIPUNCTURE: CPT

## 2025-02-10 PROCEDURE — 83550 IRON BINDING TEST: CPT

## 2025-02-10 PROCEDURE — 86708 HEPATITIS A ANTIBODY: CPT

## 2025-02-10 PROCEDURE — 85610 PROTHROMBIN TIME: CPT

## 2025-02-10 PROCEDURE — 80048 BASIC METABOLIC PNL TOTAL CA: CPT

## 2025-02-10 PROCEDURE — 86706 HEP B SURFACE ANTIBODY: CPT

## 2025-02-10 PROCEDURE — 85025 COMPLETE CBC W/AUTO DIFF WBC: CPT

## 2025-02-10 PROCEDURE — 82107 ALPHA-FETOPROTEIN L3: CPT

## 2025-02-10 PROCEDURE — 86803 HEPATITIS C AB TEST: CPT

## 2025-02-10 PROCEDURE — 80076 HEPATIC FUNCTION PANEL: CPT

## 2025-02-10 PROCEDURE — G0480 DRUG TEST DEF 1-7 CLASSES: HCPCS

## 2025-02-10 PROCEDURE — 82390 ASSAY OF CERULOPLASMIN: CPT

## 2025-02-10 RX ORDER — POLYETHYLENE GLYCOL 3350 17 G/17G
POWDER, FOR SOLUTION ORAL
COMMUNITY
Start: 2025-01-12

## 2025-02-10 RX ORDER — MULTIVITAMIN WITH FOLIC ACID 400 MCG
TABLET ORAL
COMMUNITY
Start: 2025-01-12

## 2025-02-10 RX ORDER — CALCIUM CARBONATE/VITAMIN D3 600 MG-10
TABLET ORAL
COMMUNITY
Start: 2025-01-12

## 2025-02-10 NOTE — PROGRESS NOTES
for platelet transfusion or platelet growth factors.    Screening for Hepatocellular Carcinoma  HCC screening was performed in 1/2025 and does not suggest HCC.    Will repeat ultrasound in 6 months.    Treatment of other medical problems in patients with chronic liver disease  There are no contraindications for the patient to take most medications that are necessary for treatment of other medical issues.    The patient has cirrhrosis and should avoid taking NSAIDs which are associated with a higher rate of developing AUDI.        The patient has recently stopped consuming alcohol.    Regular alcohol use increases the risk of toxicity from acetaminophen.  This analgesic should be avoided until the patient has been abstinent from alcohol for 6 months.      Osteoporosis  The risk of osteoporosis is increased in patients with cirrhosis.    DEXA bone density to assess for osteoporosis has not been performed.    This should be ordered by the patients primary care physician.    Vaccinations   Vaccination for viral hepatitis A and B is not needed.  The patient has serologic evidence of prior exposure or vaccination with immunity.    Routine vaccinations against other bacterial and viral agents can be performed as indicated.  Annual flu vaccination should be administered if indicated.      ALLERGIES  Allergies   Allergen Reactions    Latex Hives    Ibuprofen Other (See Comments)    Penicillins Other (See Comments)     Other Reaction(s): Rash or Itch    Bupropion Headaches    Diazepam Swelling    Trazodone Headaches       MEDICATIONS  Current Outpatient Medications   Medication Instructions    folic acid (FOLVITE) 1 mg, Oral    furosemide (LASIX) 40 mg, Oral    lidocaine (LIDODERM) 5 % TransDERmal    Multiple Vitamin (DAILY-CJ MULTIVITAMIN) TABS     omeprazole (PRILOSEC) 40 mg, Oral    polyethylene glycol (GLYCOLAX) 17 GM/SCOOP powder     simethicone (MYLICON) 80 mg, Oral    spironolactone (ALDACTONE) 100 mg, Oral

## 2025-02-11 LAB
A1AT SERPL-MCNC: 209 MG/DL (ref 101–187)
CERULOPLASMIN SERPL-MCNC: 22.9 MG/DL (ref 19–39)
HAV AB SER QL IA: POSITIVE
HBV SURFACE AB SER QL IA: NEGATIVE
HBV SURFACE AB SERPL IA-ACNC: 6.71 MIU/ML
HBV SURFACE AG SER QL: <0.1 INDEX
HBV SURFACE AG SER QL: NEGATIVE
HCV AB SERPL QL IA: NORMAL
HCV IGG SERPL QL IA: NON REACTIVE S/CO RATIO
HEP BS AB COMMENT: ABNORMAL

## 2025-02-17 LAB
PETH BLD QL SCN: NEGATIVE
PETH BLD-MCNC: NEGATIVE NG/ML

## 2025-02-18 LAB
AFP L3 MFR SERPL: 8 % (ref 0–9.9)
AFP SERPL-MCNC: 4.4 NG/ML (ref 0–9.2)

## 2025-02-23 PROBLEM — R60.0 BILATERAL LOWER EXTREMITY EDEMA: Status: ACTIVE | Noted: 2025-02-23

## 2025-02-23 PROBLEM — K70.9 ALCOHOL INDUCED LIVER DISORDER: Status: ACTIVE | Noted: 2025-02-23

## 2025-02-23 PROBLEM — R06.2 WHEEZES: Status: RESOLVED | Noted: 2020-12-31 | Resolved: 2025-02-23

## 2025-02-23 PROBLEM — R18.8 ASCITES: Status: ACTIVE | Noted: 2025-02-23

## 2025-02-23 PROBLEM — D69.6 THROMBOCYTOPENIA: Status: ACTIVE | Noted: 2025-02-23

## 2025-02-23 PROBLEM — D64.9 ANEMIA: Status: ACTIVE | Noted: 2025-02-23

## 2025-02-23 PROBLEM — T50.901A DRUG OVERDOSE: Status: RESOLVED | Noted: 2020-12-31 | Resolved: 2025-02-23

## 2025-02-23 PROBLEM — F19.10 SUBSTANCE ABUSE (HCC): Status: RESOLVED | Noted: 2020-12-31 | Resolved: 2025-02-23

## 2025-02-23 PROBLEM — F10.91 ALCOHOL USE DISORDER IN REMISSION: Status: ACTIVE | Noted: 2025-02-23

## 2025-02-23 PROBLEM — K74.60 CIRRHOSIS (HCC): Status: ACTIVE | Noted: 2020-12-31

## 2025-03-25 ENCOUNTER — OFFICE VISIT (OUTPATIENT)
Age: 65
End: 2025-03-25
Payer: OTHER GOVERNMENT

## 2025-03-25 ENCOUNTER — HOSPITAL ENCOUNTER (OUTPATIENT)
Facility: HOSPITAL | Age: 65
Setting detail: SPECIMEN
Discharge: HOME | End: 2025-03-28
Payer: OTHER GOVERNMENT

## 2025-03-25 VITALS
HEIGHT: 63 IN | OXYGEN SATURATION: 99 % | WEIGHT: 110.8 LBS | SYSTOLIC BLOOD PRESSURE: 108 MMHG | TEMPERATURE: 98.2 F | DIASTOLIC BLOOD PRESSURE: 64 MMHG | HEART RATE: 96 BPM | BODY MASS INDEX: 19.63 KG/M2

## 2025-03-25 DIAGNOSIS — K70.30 ALCOHOLIC CIRRHOSIS, UNSPECIFIED WHETHER ASCITES PRESENT (HCC): Primary | ICD-10-CM

## 2025-03-25 DIAGNOSIS — K70.30 ALCOHOLIC CIRRHOSIS, UNSPECIFIED WHETHER ASCITES PRESENT (HCC): ICD-10-CM

## 2025-03-25 LAB
ALBUMIN SERPL-MCNC: 3.1 G/DL (ref 3.4–5)
ALBUMIN/GLOB SERPL: 0.5 (ref 0.8–1.7)
ALP SERPL-CCNC: 140 U/L (ref 45–117)
ALT SERPL-CCNC: 15 U/L (ref 13–56)
ANION GAP SERPL CALC-SCNC: 6 MMOL/L (ref 3–18)
AST SERPL-CCNC: 33 U/L (ref 10–38)
BASOPHILS # BLD: 0.04 K/UL (ref 0–0.1)
BASOPHILS NFR BLD: 1.1 % (ref 0–2)
BILIRUB DIRECT SERPL-MCNC: 1.1 MG/DL (ref 0–0.2)
BILIRUB SERPL-MCNC: 2.5 MG/DL (ref 0.2–1)
BUN SERPL-MCNC: 37 MG/DL (ref 7–18)
BUN/CREAT SERPL: 32 (ref 12–20)
CALCIUM SERPL-MCNC: 9 MG/DL (ref 8.5–10.1)
CHLORIDE SERPL-SCNC: 102 MMOL/L (ref 100–111)
CO2 SERPL-SCNC: 27 MMOL/L (ref 21–32)
CREAT SERPL-MCNC: 1.14 MG/DL (ref 0.6–1.3)
DIFFERENTIAL METHOD BLD: ABNORMAL
EOSINOPHIL # BLD: 0.03 K/UL (ref 0–0.4)
EOSINOPHIL NFR BLD: 0.8 % (ref 0–5)
ERYTHROCYTE [DISTWIDTH] IN BLOOD BY AUTOMATED COUNT: 14.6 % (ref 11.6–14.5)
GLOBULIN SER CALC-MCNC: 5.7 G/DL (ref 2–4)
GLUCOSE SERPL-MCNC: 113 MG/DL (ref 74–99)
HCT VFR BLD AUTO: 27 % (ref 35–45)
HGB BLD-MCNC: 9.1 G/DL (ref 12–16)
IMM GRANULOCYTES # BLD AUTO: 0.02 K/UL (ref 0–0.04)
IMM GRANULOCYTES NFR BLD AUTO: 0.5 % (ref 0–0.5)
INR PPP: 1.5 (ref 0.9–1.1)
LYMPHOCYTES # BLD: 1.05 K/UL (ref 0.9–3.3)
LYMPHOCYTES NFR BLD: 27.6 % (ref 21–52)
MCH RBC QN AUTO: 35.7 PG (ref 24–34)
MCHC RBC AUTO-ENTMCNC: 33.7 G/DL (ref 31–37)
MCV RBC AUTO: 105.9 FL (ref 78–100)
MONOCYTES # BLD: 0.26 K/UL (ref 0.05–1.2)
MONOCYTES NFR BLD: 6.8 % (ref 3–10)
NEUTS SEG # BLD: 2.4 K/UL (ref 1.8–8)
NEUTS SEG NFR BLD: 63.2 % (ref 40–73)
NRBC # BLD: 0 K/UL (ref 0–0.01)
NRBC BLD-RTO: 0 PER 100 WBC
PLATELET # BLD AUTO: 94 K/UL (ref 135–420)
PMV BLD AUTO: 9.7 FL (ref 9.2–11.8)
POTASSIUM SERPL-SCNC: 4.5 MMOL/L (ref 3.5–5.5)
PROT SERPL-MCNC: 8.8 G/DL (ref 6.4–8.2)
PROTHROMBIN TIME: 18.1 SEC (ref 11.9–14.9)
RBC # BLD AUTO: 2.55 M/UL (ref 4.2–5.3)
SODIUM SERPL-SCNC: 135 MMOL/L (ref 136–145)
WBC # BLD AUTO: 3.8 K/UL (ref 4.6–13.2)

## 2025-03-25 PROCEDURE — 85025 COMPLETE CBC W/AUTO DIFF WBC: CPT

## 2025-03-25 PROCEDURE — 80076 HEPATIC FUNCTION PANEL: CPT

## 2025-03-25 PROCEDURE — 80048 BASIC METABOLIC PNL TOTAL CA: CPT

## 2025-03-25 PROCEDURE — 85610 PROTHROMBIN TIME: CPT

## 2025-03-25 PROCEDURE — 99214 OFFICE O/P EST MOD 30 MIN: CPT | Performed by: NURSE PRACTITIONER

## 2025-03-25 PROCEDURE — 36415 COLL VENOUS BLD VENIPUNCTURE: CPT

## 2025-03-25 RX ORDER — LANOLIN ALCOHOL/MO/W.PET/CERES
CREAM (GRAM) TOPICAL
COMMUNITY
Start: 2025-02-26

## 2025-03-25 RX ORDER — MIRTAZAPINE 15 MG/1
TABLET, FILM COATED ORAL
COMMUNITY
Start: 2025-03-14

## 2025-03-25 NOTE — PROGRESS NOTES
ABOVE:  Constitution systems: Negative for fever, chills, weight gain, weight loss.   GI:  Negative for constipation or diarrhea.  Hematology: Negative for easy bruising, blood clots.    Musculo-skelatal: Negative for back pain, muscle pain, weakness.  Neurologic: Negative for headaches, dizziness, vertigo, memory problems not related to HE.  Psychology: Negative for anxiety, depression.     FAMILY HISTORY:  The father  of pancreas cancer.    The mother  of CVA.    There is no family history of liver disease.      SOCIAL HISTORY:  The patient is .    The patient has 2 children and 5 grandchildren.    The patient currently smokes 7 cigarettes daily.    The patient has previously consumed alcohol in excess.    The patient has been abstinent from alcohol since 2025.    The patient used to work as manager of a retail store.    The patient does not work outside the home.      PHYSICAL EXAMINATION:  VS: /64 (BP Site: Right Upper Arm, Patient Position: Sitting, BP Cuff Size: Medium Adult)   Pulse 96   Temp 98.2 °F (36.8 °C) (Temporal)   Ht 1.6 m (5' 3\")   Wt 50.3 kg (110 lb 12.8 oz)   SpO2 99%   BMI 19.63 kg/m²     General:  No acute distress.   Eyes:  Sclera anicteric.   Skin:  Spider angiomata.  No jaundice.  Abdomen:  Soft non-tender, No obvious ascites.  Extremities:  No lower extremity edema. Some muscle wasting.  Neurologic:  Alert and oriented. Cranial nerves grossly intact.  No asterixis.    LABORATORY STUDIES:   Latest Ref Rng 2/10/2025 3/25/2025   ANATOLY - Routine Labs      WBC 4.6 - 13.2 K/uL 4.5 (L)  3.8 (L)    ANC 1.80 - 8.00 K/UL 2.96  2.40    HGB 12.0 - 16.0 g/dL 8.8 (L)  9.1 (L)     - 420 K/uL 110 (L)  94 (L)    INR 0.9 - 1.1   1.5 (H)  1.5 (H)    AST 10 - 38 U/L 51 (H)  33    ALT 13 - 56 U/L 19  15    Alk Phos 45 - 117 U/L 178 (H)  140 (H)    Bili, Total 0.2 - 1.0 MG/DL 2.7 (H)  2.5 (H)    Bili, Direct 0.0 - 0.2 MG/DL 1.5 (H)  1.1 (H)    Albumin 3.4 - 5.0 g/dL 3.3 (L)  3.1

## 2025-03-27 ENCOUNTER — RESULTS FOLLOW-UP (OUTPATIENT)
Age: 65
End: 2025-03-27

## 2025-04-09 NOTE — TELEPHONE ENCOUNTER
Spoke with pt and gave ultrasound/lab results as written ----- Message from MARILOU Mullen NP sent at 3/27/2025  4:44 PM EDT -----  Labs remain abnormal but are improving with abstinence from alcohol. Follow up as scheduled and keep up the good work!

## 2025-04-09 NOTE — TELEPHONE ENCOUNTER
...Lvm asking pt to return a call to the office to receive the results of her lab  ----- Message from MARILOU Mullen NP sent at 3/27/2025  4:44 PM EDT -----  Labs remain abnormal but are improving with abstinence from alcohol. Follow up as scheduled and keep up the good work!

## 2025-05-27 ENCOUNTER — TELEPHONE (OUTPATIENT)
Age: 65
End: 2025-05-27

## 2025-05-27 NOTE — TELEPHONE ENCOUNTER
Lvm asking pt to return a call to the office to schedule appt with cali lang out of office) for 6/2/25